# Patient Record
Sex: MALE | HISPANIC OR LATINO | Employment: UNEMPLOYED | URBAN - NONMETROPOLITAN AREA
[De-identification: names, ages, dates, MRNs, and addresses within clinical notes are randomized per-mention and may not be internally consistent; named-entity substitution may affect disease eponyms.]

---

## 2017-04-03 ENCOUNTER — HOSPITAL ENCOUNTER (INPATIENT)
Facility: HOSPITAL | Age: 47
LOS: 3 days | Discharge: HOME | End: 2017-04-06
Attending: FAMILY MEDICINE | Admitting: INTERNAL MEDICINE

## 2017-04-03 ENCOUNTER — APPOINTMENT (OUTPATIENT)
Dept: GENERAL RADIOLOGY | Facility: HOSPITAL | Age: 47
End: 2017-04-03

## 2017-04-03 ENCOUNTER — APPOINTMENT (OUTPATIENT)
Dept: CT IMAGING | Facility: HOSPITAL | Age: 47
End: 2017-04-03

## 2017-04-03 DIAGNOSIS — K57.92 ACUTE DIVERTICULITIS: Primary | ICD-10-CM

## 2017-04-03 DIAGNOSIS — E87.0 ACUTE HYPERNATREMIA: ICD-10-CM

## 2017-04-03 DIAGNOSIS — E86.0 SEVERE DEHYDRATION: ICD-10-CM

## 2017-04-03 LAB
ALBUMIN SERPL-MCNC: 4.9 G/DL (ref 3.4–4.8)
ALBUMIN/GLOB SERPL: 1.5 G/DL (ref 1.1–1.8)
ALP SERPL-CCNC: 105 U/L (ref 38–126)
ALT SERPL W P-5'-P-CCNC: 54 U/L (ref 21–72)
AMYLASE SERPL-CCNC: 82 U/L (ref 50–130)
ANION GAP SERPL CALCULATED.3IONS-SCNC: 18 MMOL/L (ref 5–15)
AST SERPL-CCNC: 36 U/L (ref 17–59)
BACTERIA UR QL AUTO: ABNORMAL /HPF
BASOPHILS # BLD AUTO: 0.03 10*3/MM3 (ref 0–0.2)
BASOPHILS NFR BLD AUTO: 0.2 % (ref 0–2)
BILIRUB SERPL-MCNC: 0.4 MG/DL (ref 0.2–1.3)
BILIRUB UR QL STRIP: NEGATIVE
BUN BLD-MCNC: 16 MG/DL (ref 7–21)
BUN/CREAT SERPL: 21.3 (ref 7–25)
CALCIUM SPEC-SCNC: 9.4 MG/DL (ref 8.4–10.2)
CHLORIDE SERPL-SCNC: 100 MMOL/L (ref 95–110)
CK MB SERPL-CCNC: 0.77 NG/ML (ref 0–5)
CK SERPL-CCNC: 65 U/L (ref 55–170)
CLARITY UR: ABNORMAL
CO2 SERPL-SCNC: 35 MMOL/L (ref 22–31)
COLOR UR: YELLOW
CREAT BLD-MCNC: 0.75 MG/DL (ref 0.7–1.3)
DEPRECATED RDW RBC AUTO: 40.1 FL (ref 35.1–43.9)
EOSINOPHIL # BLD AUTO: 0.01 10*3/MM3 (ref 0–0.7)
EOSINOPHIL NFR BLD AUTO: 0.1 % (ref 0–7)
ERYTHROCYTE [DISTWIDTH] IN BLOOD BY AUTOMATED COUNT: 13.3 % (ref 11.5–14.5)
GFR SERPL CREATININE-BSD FRML MDRD: 113 ML/MIN/1.73 (ref 60–147)
GFR SERPL CREATININE-BSD FRML MDRD: 137 ML/MIN/1.73 (ref 63–147)
GLOBULIN UR ELPH-MCNC: 3.3 GM/DL (ref 2.3–3.5)
GLUCOSE BLD-MCNC: 133 MG/DL (ref 60–100)
GLUCOSE UR STRIP-MCNC: NEGATIVE MG/DL
HCT VFR BLD AUTO: 47.1 % (ref 39–49)
HGB BLD-MCNC: 16.3 G/DL (ref 13.7–17.3)
HGB UR QL STRIP.AUTO: NEGATIVE
HOLD SPECIMEN: NORMAL
HYALINE CASTS UR QL AUTO: ABNORMAL /LPF
IMM GRANULOCYTES # BLD: 0.03 10*3/MM3 (ref 0–0.02)
IMM GRANULOCYTES NFR BLD: 0.2 % (ref 0–0.5)
KETONES UR QL STRIP: NEGATIVE
LEUKOCYTE ESTERASE UR QL STRIP.AUTO: NEGATIVE
LIPASE SERPL-CCNC: 37 U/L (ref 23–300)
LYMPHOCYTES # BLD AUTO: 1.23 10*3/MM3 (ref 0.6–4.2)
LYMPHOCYTES NFR BLD AUTO: 9.1 % (ref 10–50)
MCH RBC QN AUTO: 28.9 PG (ref 26.5–34)
MCHC RBC AUTO-ENTMCNC: 34.6 G/DL (ref 31.5–36.3)
MCV RBC AUTO: 83.5 FL (ref 80–98)
MONOCYTES # BLD AUTO: 0.46 10*3/MM3 (ref 0–0.9)
MONOCYTES NFR BLD AUTO: 3.4 % (ref 0–12)
NEUTROPHILS # BLD AUTO: 11.75 10*3/MM3 (ref 2–8.6)
NEUTROPHILS NFR BLD AUTO: 87 % (ref 37–80)
NITRITE UR QL STRIP: NEGATIVE
PH UR STRIP.AUTO: 8.5 [PH] (ref 5–9)
PLATELET # BLD AUTO: 305 10*3/MM3 (ref 150–450)
PMV BLD AUTO: 10.7 FL (ref 8–12)
POTASSIUM BLD-SCNC: 3.1 MMOL/L (ref 3.5–5.1)
PROT SERPL-MCNC: 8.2 G/DL (ref 6.3–8.6)
PROT UR QL STRIP: ABNORMAL
RBC # BLD AUTO: 5.64 10*6/MM3 (ref 4.37–5.74)
RBC # UR: ABNORMAL /HPF
REF LAB TEST METHOD: ABNORMAL
SODIUM BLD-SCNC: 153 MMOL/L (ref 137–145)
SP GR UR STRIP: 1.02 (ref 1–1.03)
SQUAMOUS #/AREA URNS HPF: ABNORMAL /HPF
TROPONIN I SERPL-MCNC: <0.012 NG/ML
UROBILINOGEN UR QL STRIP: ABNORMAL
WBC NRBC COR # BLD: 13.51 10*3/MM3 (ref 3.2–9.8)
WBC UR QL AUTO: ABNORMAL /HPF
WHOLE BLOOD HOLD SPECIMEN: NORMAL

## 2017-04-03 PROCEDURE — 0 IOPAMIDOL 61 % SOLUTION: Performed by: FAMILY MEDICINE

## 2017-04-03 PROCEDURE — 82150 ASSAY OF AMYLASE: CPT | Performed by: FAMILY MEDICINE

## 2017-04-03 PROCEDURE — 25010000002 LEVOFLOXACIN PER 250 MG: Performed by: FAMILY MEDICINE

## 2017-04-03 PROCEDURE — 74177 CT ABD & PELVIS W/CONTRAST: CPT

## 2017-04-03 PROCEDURE — 99284 EMERGENCY DEPT VISIT MOD MDM: CPT

## 2017-04-03 PROCEDURE — 80053 COMPREHEN METABOLIC PANEL: CPT | Performed by: FAMILY MEDICINE

## 2017-04-03 PROCEDURE — 82553 CREATINE MB FRACTION: CPT | Performed by: FAMILY MEDICINE

## 2017-04-03 PROCEDURE — 25010000002 ONDANSETRON PER 1 MG: Performed by: FAMILY MEDICINE

## 2017-04-03 PROCEDURE — 74022 RADEX COMPL AQT ABD SERIES: CPT

## 2017-04-03 PROCEDURE — 74270 X-RAY XM COLON 1CNTRST STD: CPT

## 2017-04-03 PROCEDURE — 81001 URINALYSIS AUTO W/SCOPE: CPT | Performed by: FAMILY MEDICINE

## 2017-04-03 PROCEDURE — 99232 SBSQ HOSP IP/OBS MODERATE 35: CPT | Performed by: SURGERY

## 2017-04-03 PROCEDURE — 85025 COMPLETE CBC W/AUTO DIFF WBC: CPT | Performed by: FAMILY MEDICINE

## 2017-04-03 PROCEDURE — 82550 ASSAY OF CK (CPK): CPT | Performed by: FAMILY MEDICINE

## 2017-04-03 PROCEDURE — 25010000002 MORPHINE PER 10 MG: Performed by: FAMILY MEDICINE

## 2017-04-03 PROCEDURE — 84484 ASSAY OF TROPONIN QUANT: CPT | Performed by: FAMILY MEDICINE

## 2017-04-03 PROCEDURE — 83690 ASSAY OF LIPASE: CPT | Performed by: FAMILY MEDICINE

## 2017-04-03 RX ORDER — DEXTROSE MONOHYDRATE 50 MG/ML
125 INJECTION, SOLUTION INTRAVENOUS CONTINUOUS
Status: DISCONTINUED | OUTPATIENT
Start: 2017-04-03 | End: 2017-04-06 | Stop reason: HOSPADM

## 2017-04-03 RX ORDER — PANTOPRAZOLE SODIUM 40 MG/10ML
40 INJECTION, POWDER, LYOPHILIZED, FOR SOLUTION INTRAVENOUS
Status: DISCONTINUED | OUTPATIENT
Start: 2017-04-04 | End: 2017-04-06 | Stop reason: HOSPADM

## 2017-04-03 RX ORDER — LEVOFLOXACIN 5 MG/ML
750 INJECTION, SOLUTION INTRAVENOUS ONCE
Status: COMPLETED | OUTPATIENT
Start: 2017-04-03 | End: 2017-04-03

## 2017-04-03 RX ORDER — PANTOPRAZOLE SODIUM 40 MG/10ML
80 INJECTION, POWDER, LYOPHILIZED, FOR SOLUTION INTRAVENOUS ONCE
Status: COMPLETED | OUTPATIENT
Start: 2017-04-03 | End: 2017-04-03

## 2017-04-03 RX ORDER — ONDANSETRON 2 MG/ML
4 INJECTION INTRAMUSCULAR; INTRAVENOUS EVERY 6 HOURS PRN
Status: DISCONTINUED | OUTPATIENT
Start: 2017-04-03 | End: 2017-04-06 | Stop reason: HOSPADM

## 2017-04-03 RX ORDER — SODIUM CHLORIDE 0.9 % (FLUSH) 0.9 %
1-10 SYRINGE (ML) INJECTION AS NEEDED
Status: DISCONTINUED | OUTPATIENT
Start: 2017-04-03 | End: 2017-04-06 | Stop reason: HOSPADM

## 2017-04-03 RX ORDER — SODIUM CHLORIDE 9 MG/ML
125 INJECTION, SOLUTION INTRAVENOUS CONTINUOUS
Status: DISCONTINUED | OUTPATIENT
Start: 2017-04-03 | End: 2017-04-03

## 2017-04-03 RX ORDER — SODIUM CHLORIDE 9 MG/ML
1000 INJECTION, SOLUTION INTRAVENOUS ONCE
Status: COMPLETED | OUTPATIENT
Start: 2017-04-03 | End: 2017-04-03

## 2017-04-03 RX ORDER — MORPHINE SULFATE 4 MG/ML
4 INJECTION, SOLUTION INTRAMUSCULAR; INTRAVENOUS ONCE
Status: COMPLETED | OUTPATIENT
Start: 2017-04-03 | End: 2017-04-03

## 2017-04-03 RX ORDER — ONDANSETRON 2 MG/ML
4 INJECTION INTRAMUSCULAR; INTRAVENOUS ONCE
Status: COMPLETED | OUTPATIENT
Start: 2017-04-03 | End: 2017-04-03

## 2017-04-03 RX ADMIN — METRONIDAZOLE 500 MG: 500 INJECTION, SOLUTION INTRAVENOUS at 21:06

## 2017-04-03 RX ADMIN — ONDANSETRON 4 MG: 2 INJECTION INTRAMUSCULAR; INTRAVENOUS at 13:04

## 2017-04-03 RX ADMIN — DEXTROSE MONOHYDRATE 125 ML/HR: 50 INJECTION, SOLUTION INTRAVENOUS at 21:07

## 2017-04-03 RX ADMIN — DEXTROSE MONOHYDRATE 125 ML/HR: 50 INJECTION, SOLUTION INTRAVENOUS at 15:03

## 2017-04-03 RX ADMIN — SODIUM CHLORIDE 125 ML/HR: 9 INJECTION, SOLUTION INTRAVENOUS at 13:09

## 2017-04-03 RX ADMIN — LEVOFLOXACIN 750 MG: 5 INJECTION, SOLUTION INTRAVENOUS at 15:42

## 2017-04-03 RX ADMIN — MORPHINE SULFATE 4 MG: 4 INJECTION, SOLUTION INTRAMUSCULAR; INTRAVENOUS at 13:06

## 2017-04-03 RX ADMIN — SODIUM CHLORIDE 1000 ML: 900 INJECTION, SOLUTION INTRAVENOUS at 13:51

## 2017-04-03 RX ADMIN — PANTOPRAZOLE SODIUM 80 MG: 40 INJECTION, POWDER, FOR SOLUTION INTRAVENOUS at 13:01

## 2017-04-03 RX ADMIN — IOPAMIDOL 94 ML: 612 INJECTION, SOLUTION INTRAVENOUS at 14:34

## 2017-04-03 NOTE — CONSULTS
Consult Note  Patient Care Team:  No Known Provider as PCP - Juliette Shah MD  Chief complaint abd pain    Subjective     Patient is a 46 y.o. male presents with a one-month history of worsening abdominal pain.  His gotten much worse in the past 4 days.  He still having bowel movements.  He did have a bowel movement yesterday.  He had some nausea and vomiting today which is now better.  He has not had any fevers.  He past surgical history of appendectomy.  There is no family history of colon cancer.  He's never had a colonoscopy.  He's never had diverticulitis before.  The pain is mostly in his left lower quadrant in a bandlike fashion.    Review of Systems   Review of Systems   Constitutional: Negative.    HENT: Negative.    Eyes: Negative.    Respiratory: Negative.    Cardiovascular: Negative.    Gastrointestinal: Negative.    Endocrine: Negative.    Genitourinary: Negative.    Musculoskeletal: Negative.    Skin: Negative.    Allergic/Immunologic: Negative.    Neurological: Negative.    Hematological: Negative.    Psychiatric/Behavioral: Negative.      History  History reviewed. No pertinent past medical history.  History reviewed. No pertinent surgical history.  No family history on file.  Social History   Substance Use Topics   • Smoking status: None   • Smokeless tobacco: None   • Alcohol use None       (Not in a hospital admission)  Allergies:  Review of patient's allergies indicates no known allergies.    Objective     Vital Signs  Temp:  [98.7 °F (37.1 °C)] 98.7 °F (37.1 °C)  Heart Rate:  [50-65] 59  Resp:  [18-20] 18  BP: (124-165)/(58-83) 124/58    Physical Exam:    Physical Exam   Constitutional: He is oriented to person, place, and time. He appears well-developed.   HENT:   Head: Normocephalic and atraumatic.   Eyes: Pupils are equal, round, and reactive to light.   Neck: Normal range of motion. Neck supple.   Cardiovascular: Normal rate, regular rhythm, normal heart sounds and intact  distal pulses.    Pulmonary/Chest: Effort normal and breath sounds normal.   Abdominal: Soft. Bowel sounds are normal. He exhibits distension. There is tenderness.   Musculoskeletal: Normal range of motion.   Neurological: He is alert and oriented to person, place, and time.   Skin: Skin is warm and dry.   Psychiatric: He has a normal mood and affect. His behavior is normal.    abdomen is diffusely distended however there is no focal tenderness anywhere.    Results Review:      Lab Results (last 24 hours)     Procedure Component Value Units Date/Time    [facility] Draw [[id number]] Collected:  04/03/17 1300    Specimen:  Blood Updated:  04/03/17 1311    Narrative:       The following orders were created for panel order [facility] Draw.  Procedure                               Abnormality         Status                     ---------                               -----------         ------                     Light Blue Top[[id number]]                                    In process                 Gold Top - SST[[id number]]                                    In process                   Please view results for these tests on the individual orders.    [facility] - SST [[id number]] Collected:  04/03/17 1300    Specimen:  Blood Updated:  04/03/17 1311    Light Blue Top [[id number]] Collected:  04/03/17 1300    Specimen:  Blood Updated:  04/03/17 1311    CBC & Differential [[id number]] Collected:  04/03/17 1300    Specimen:  Blood Updated:  04/03/17 1322    Narrative:       The following orders were created for panel order CBC & Differential.  Procedure                               Abnormality         Status                     ---------                               -----------         ------                     CBC Auto Differential[[id number]]         Abnormal            Final result                 Please view results for these tests on the individual orders.    CBC Auto Differential [[id number]]  (Abnormal) Collected:  04/03/17 1300     Specimen:  Blood Updated:  04/03/17 1322     WBC 13.51 (H) 10*3/mm3      RBC 5.64 10*6/mm3      Hemoglobin 16.3 g/dL      Hematocrit 47.1 %      MCV 83.5 fL      MCH 28.9 pg      MCHC 34.6 g/dL      RDW 13.3 %      RDW-SD 40.1 fl      MPV 10.7 fL      Platelets 305 10*3/mm3      Neutrophil % 87.0 (H) %      Lymphocyte % 9.1 (L) %      Monocyte % 3.4 %      Eosinophil % 0.1 %      Basophil % 0.2 %      Immature Grans % 0.2 %      Neutrophils, Absolute 11.75 (H) 10*3/mm3      Lymphocytes, Absolute 1.23 10*3/mm3      Monocytes, Absolute 0.46 10*3/mm3      Eosinophils, Absolute 0.01 10*3/mm3      Basophils, Absolute 0.03 10*3/mm3      Immature Grans, Absolute 0.03 (H) 10*3/mm3     Comprehensive Metabolic Panel [[id number]]  (Abnormal) Collected:  04/03/17 1300    Specimen:  Blood Updated:  04/03/17 1330     Glucose 133 (H) mg/dL      BUN 16 mg/dL      Creatinine 0.75 mg/dL      Sodium 153 (H) mmol/L      Potassium 3.1 (L) mmol/L      Chloride 100 mmol/L      CO2 35.0 (H) mmol/L      Calcium 9.4 mg/dL      Total Protein 8.2 g/dL      Albumin 4.90 (H) g/dL      ALT (SGPT) 54 U/L      AST (SGOT) 36 U/L      Alkaline Phosphatase 105 U/L      Total Bilirubin 0.4 mg/dL      eGFR Non African Amer 113 mL/min/1.73      eGFR  African Amer 137 mL/min/1.73      Globulin 3.3 gm/dL      A/G Ratio 1.5 g/dL      BUN/Creatinine Ratio 21.3     Anion Gap 18.0 (H) mmol/L     Lipase [[id number]]  (Normal) Collected:  04/03/17 1300    Specimen:  Blood Updated:  04/03/17 1330     Lipase 37 U/L     Amylase [[id number]]  (Normal) Collected:  04/03/17 1300    Specimen:  Blood Updated:  04/03/17 1330     Amylase 82 U/L     CK [[id number]]  (Normal) Collected:  04/03/17 1300    Specimen:  Blood Updated:  04/03/17 1330     Creatine Kinase 65 U/L     CK-MB [[id number]]  (Normal) Collected:  04/03/17 1300    Specimen:  Blood Updated:  04/03/17 1343     CKMB 0.77 ng/mL     Troponin [[id number]]  (Normal) Collected:  04/03/17 1300    Specimen:  Blood Updated:   04/03/17 1343     Troponin I <0.012 ng/mL     Urinalysis With / Culture If Indicated [[id number]]  (Abnormal) Collected:  04/03/17 1329    Specimen:  Urine from Urine, Clean Catch Updated:  04/03/17 1426     Color, UA Yellow     Appearance, UA Cloudy (A)     pH, UA 8.5     Specific Gravity, UA 1.020     Glucose, UA Negative     Ketones, UA Negative     Bilirubin, UA Negative     Blood, UA Negative     Protein,  mg/dL (2+) (A)     Leuk Esterase, UA Negative     Nitrite, UA Negative     Urobilinogen, UA 0.2 E.U./dL    Urinalysis, Microscopic Only [[id number]]  (Abnormal) Collected:  04/03/17 1329    Specimen:  Urine from Urine, Clean Catch Updated:  04/03/17 1426     RBC, UA 0-2 (A) /HPF      WBC, UA 0-2 /HPF      Bacteria, UA None Seen /HPF      Squamous Epithelial Cells, UA None Seen /HPF      Hyaline Casts, UA 0-2 /LPF      Methodology Automated Microscopy       CT scan is reviewed and appears to be an obstructing process in the proximal sigmoid colon.  It is difficult to differentiate the other it is a mass or a diverticulitis.  There is some stranding around it.  The maximal width of the cecum is 8.5 centimeters.    Assessment/Plan     Active Problems:    Acute diverticulitis    Mr. Elvin toledo is a 46-year-old gentleman with large bowel obstruction due to diverticulitis versus colon cancer.  His history and exam is more likely to fit with a obstructing colon cancer.  However on still treat him as if he was a diverticulitis with nothing by mouth and IV antibiotics.  In this is a subacute obstruction and an doesn't appear to have an impending perforation at this time.  So he doesn't an operation today but he will need one soon.  Would like to get him and diagnosed and decompressed and this would be best be done with a colonoscopy.  Have consulted with GI Dr. Haile for colonoscopy and management.  We'll also order a barium enema to further delineate the obstruction.  I would recommend to place an NG tube if he  becomes nauseated or throws up or becomes more distended.  He also has multiple electrolyte abnormalities that need to be corrected before he goes to the OR.  Thank you for the consult.  I'll follow along closely.    I discussed the patients findings and my recommendations with patient and family.     Bereket Velasquez MD  04/03/17  3:59 PM

## 2017-04-03 NOTE — ED NOTES
Pt c/o abdominal pain for four days without a BM. The pt drank a whole bottle of milk of magnesia, half yesterday and half today without having a BM. The pt's abdomin is tender to the touch and distended. Pt is vomiting and in pain.      Vivian Paris, RN  04/03/17 5738

## 2017-04-03 NOTE — ED PROVIDER NOTES
"Subjective   HPI Comments: 46 year old presents for periumbilical pain with severe vomiting  /nausea .       Patient is a 46 y.o. male presenting with abdominal pain.   History provided by:  Patient  Abdominal Pain   Pain location:  Periumbilical  Pain quality: aching and dull    Pain radiates to:  Does not radiate  Pain severity:  Moderate  Onset quality:  Sudden  Duration:  4 days  Timing:  Constant  Progression:  Worsening  Chronicity:  New  Context: not alcohol use, not awakening from sleep, not diet changes, not medication withdrawal, not previous surgeries, not recent illness, not retching, not sick contacts and not suspicious food intake    Relieved by:  Nothing  Worsened by:  Nothing  Ineffective treatments:  None tried  Associated symptoms: anorexia, fatigue, nausea and vomiting    Risk factors: no alcohol abuse, no aspirin use, not elderly, has not had multiple surgeries, no NSAID use and not obese        Review of Systems   Constitutional: Positive for fatigue.   HENT: Negative.    Respiratory: Negative.    Cardiovascular: Negative.    Gastrointestinal: Positive for abdominal pain, anorexia, nausea and vomiting.   Musculoskeletal: Negative.    Skin: Negative.    Neurological: Negative.    Psychiatric/Behavioral: Negative.        History reviewed. No pertinent past medical history.    No Known Allergies    History reviewed. No pertinent surgical history.    No family history on file.    Social History     Social History   • Marital status:      Spouse name: N/A   • Number of children: N/A   • Years of education: N/A     Social History Main Topics   • Smoking status: None   • Smokeless tobacco: None   • Alcohol use None   • Drug use: None   • Sexual activity: Not Asked     Other Topics Concern   • None     Social History Narrative   • None           Objective    /80 (BP Location: Right arm, Patient Position: Lying)  Pulse 54  Temp 98.7 °F (37.1 °C) (Oral)   Resp 18  Ht 63\" (160 cm)  Wt 169 " lb 12.8 oz (77 kg)  SpO2 99%  BMI 30.08 kg/m2      Physical Exam   Constitutional: He is oriented to person, place, and time. He appears well-developed and well-nourished.   HENT:   Head: Normocephalic and atraumatic.   Right Ear: External ear normal.   Left Ear: External ear normal.   Nose: Nose normal.   Mouth/Throat: Oropharynx is clear and moist.   Eyes: Conjunctivae and EOM are normal. Pupils are equal, round, and reactive to light.   Neck: Normal range of motion. Neck supple.   Cardiovascular: Normal rate, regular rhythm, normal heart sounds and intact distal pulses.    Pulmonary/Chest: Effort normal and breath sounds normal. No accessory muscle usage. No respiratory distress. He exhibits no tenderness and no deformity.   Abdominal: Soft. He exhibits distension. Bowel sounds are decreased. There is tenderness in the periumbilical area and suprapubic area. There is guarding.   Musculoskeletal: Normal range of motion.   Neurological: He is alert and oriented to person, place, and time. He has normal reflexes.   Skin: Skin is warm.   Psychiatric: He has a normal mood and affect. His behavior is normal. Judgment and thought content normal.   Nursing note and vitals reviewed.      Procedures         ED Course  ED Course      Labs Reviewed   COMPREHENSIVE METABOLIC PANEL - Abnormal; Notable for the following:        Result Value    Glucose 133 (*)     Sodium 153 (*)     Potassium 3.1 (*)     CO2 35.0 (*)     Albumin 4.90 (*)     Anion Gap 18.0 (*)     All other components within normal limits   URINALYSIS W/ CULTURE IF INDICATED - Abnormal; Notable for the following:     Appearance, UA Cloudy (*)     Protein,  mg/dL (2+) (*)     All other components within normal limits   CBC WITH AUTO DIFFERENTIAL - Abnormal; Notable for the following:     WBC 13.51 (*)     Neutrophil % 87.0 (*)     Lymphocyte % 9.1 (*)     Neutrophils, Absolute 11.75 (*)     Immature Grans, Absolute 0.03 (*)     All other components within  normal limits   URINALYSIS, MICROSCOPIC ONLY - Abnormal; Notable for the following:     RBC, UA 0-2 (*)     All other components within normal limits   LIPASE - Normal   AMYLASE - Normal   CK - Normal   CK MB - Normal   TROPONIN (IN-HOUSE) - Normal   RAINBOW DRAW    Narrative:     The following orders were created for panel order [place] Draw.  Procedure                               Abnormality         Status                     ---------                               -----------         ------                     Light Blue Top[[id number]]                                    In process                 Gold Top - SST[[id number]]                                    In process                   Please view results for these tests on the individual orders.   CBC AND DIFFERENTIAL    Narrative:     The following orders were created for panel order CBC & Differential.  Procedure                               Abnormality         Status                     ---------                               -----------         ------                     CBC Auto Differential[[id number]]         Abnormal            Final result                 Please view results for these tests on the individual orders.   LIGHT BLUE TOP   GOLD TOP - SST     Xr Abdomen 2 View With Chest 1 View    Result Date: 4/3/2017  Narrative: Patient Name:  LANCE NOBLES Patient ID:  [id number] Ordering:  SUN CHU Attending:  SUN CHU Referring:  SUN CHU ------------------------------------------------ Abdominal series with single view chest HISTORY: Vomiting. Nausea. Upright PA film of the chest and supine and upright abdominal films obtained. COMPARISON: None Low lung volumes. The lungs are clear of an acute process. The heart is not enlarged. The pulmonary vasculature is not increased. No pleural effusion. No pneumothorax. No acute osseous abnormality. No free air. Gas and feces in the colon with some gaseous distention transverse colon.  Air-fluid levels in the small and large bowel, compatible with an adynamic ileus. No organomegaly. No abnormal calcifications. Old compression deformities lumbar spine. Minimal degenerative changes lumbar spine. Surgical sutures right lower quadrant.     Impression: CONCLUSION: Adynamic ileus. Low lung volumes. [id number] Electronically signed by:  Ross Cleveland MD  4/3/2017 1:33 PM CDT Workstation: [facility]    Ct Abdomen Pelvis With Contrast    Result Date: 4/3/2017  Narrative: Patient Name:  LANCE NOBLES Patient ID:  [id number] Ordering:  SUN CHU Attending:  SUN CHU Referring:  SUN CHU ------------------------------------------------ CT abdomen, pelvis with contrast. CLINICAL INDICATION: Periumbilical abdominal pain.   COMPARISON: CT abdomen, pelvis February 11, 2010.   TECHNIQUE: Oral and nonionic IV contrast. 94 mL Isovue-300. Helical scanning with axial and coronal reformations. Soft tissue, lung, and bone windows reviewed. This exam was performed using radiation doses that are As Low As Reasonably Achievable (ALARA).This exam was performed according to our departmental dose optimization program, which includes automated exposure control, adjustment of the mA and/or KV according to patient size and/or use of iterative reconstruction technique. ABDOMEN CT FINDINGS: The visualized lung bases show minor dependent changes. Contrast is noted within the distal esophagus suggesting gastroesophageal reflux. Liver, gallbladder, spleen, pancreas, adrenal glands and kidneys are normal in appearance. The colon is very dilated. The cecum 8.73 cm. The colon is filled with fluid and stool. There is an area of luminal narrowing and pericolonic inflammatory changes in the sigmoid colon series 2 images 75-77. This may represent an obstructing sigmoid neoplasm versus diverticulitis. Colonoscopy should be considered for further evaluation. No evidence of pathologically enlarged nodes, free air, or free  fluid. Degenerative changes of the spine. The bones are grossly unremarkable. PELVIS CT FINDINGS: No pelvic masses.    No evidence of pathologically enlarged nodes, free air, or free fluid. The bones are grossly unremarkable.     Impression: CONCLUSION: Dilated colon filled with fluid and stool . Area of luminal narrowing and pericolonic inflammatory changes sigmoid colon. Differential considerations would include an obstructing sigmoid neoplasm versus diverticulitis. CT abdomen, pelvis with contrast is otherwise unremarkable. Electronically signed by:  Kvng Rasmussen MD  4/3/2017 3:03 PM CDT Workstation: [id number]    Ileus on x ray - ct scan reveals - inflammation around sigmoid colon with luminal narrowing.   Discussed case with dr Velasquez and will see pt in ed.   Discussed with hospitalist to admit for iv antbiotics and stabilisation of condition .             MDM  Number of Diagnoses or Management Options  Acute diverticulitis:   Acute hypernatremia:   Severe dehydration:      Amount and/or Complexity of Data Reviewed  Clinical lab tests: ordered and reviewed  Tests in the radiology section of CPT®: ordered and reviewed  Tests in the medicine section of CPT®: ordered and reviewed        Final diagnoses:   Acute diverticulitis   Severe dehydration   Acute hypernatremia            Juliette Lacy MD  04/03/17 9077

## 2017-04-04 ENCOUNTER — APPOINTMENT (OUTPATIENT)
Dept: GENERAL RADIOLOGY | Facility: HOSPITAL | Age: 47
End: 2017-04-04

## 2017-04-04 LAB
ANION GAP SERPL CALCULATED.3IONS-SCNC: 14 MMOL/L (ref 5–15)
BASOPHILS # BLD AUTO: 0.03 10*3/MM3 (ref 0–0.2)
BASOPHILS NFR BLD AUTO: 0.4 % (ref 0–2)
BUN BLD-MCNC: 13 MG/DL (ref 7–21)
BUN/CREAT SERPL: 19.1 (ref 7–25)
CALCIUM SPEC-SCNC: 8.3 MG/DL (ref 8.4–10.2)
CHLORIDE SERPL-SCNC: 102 MMOL/L (ref 95–110)
CO2 SERPL-SCNC: 29 MMOL/L (ref 22–31)
CREAT BLD-MCNC: 0.68 MG/DL (ref 0.7–1.3)
DEPRECATED RDW RBC AUTO: 41.1 FL (ref 35.1–43.9)
EOSINOPHIL # BLD AUTO: 0.08 10*3/MM3 (ref 0–0.7)
EOSINOPHIL NFR BLD AUTO: 1.2 % (ref 0–7)
ERYTHROCYTE [DISTWIDTH] IN BLOOD BY AUTOMATED COUNT: 13.5 % (ref 11.5–14.5)
GFR SERPL CREATININE-BSD FRML MDRD: 127 ML/MIN/1.73 (ref 60–147)
GFR SERPL CREATININE-BSD FRML MDRD: 154 ML/MIN/1.73 (ref 63–147)
GLUCOSE BLD-MCNC: 107 MG/DL (ref 60–100)
HCT VFR BLD AUTO: 39.8 % (ref 39–49)
HGB BLD-MCNC: 13.7 G/DL (ref 13.7–17.3)
IMM GRANULOCYTES # BLD: 0.01 10*3/MM3 (ref 0–0.02)
IMM GRANULOCYTES NFR BLD: 0.1 % (ref 0–0.5)
LYMPHOCYTES # BLD AUTO: 1.66 10*3/MM3 (ref 0.6–4.2)
LYMPHOCYTES NFR BLD AUTO: 24.4 % (ref 10–50)
MCH RBC QN AUTO: 29 PG (ref 26.5–34)
MCHC RBC AUTO-ENTMCNC: 34.4 G/DL (ref 31.5–36.3)
MCV RBC AUTO: 84.3 FL (ref 80–98)
MONOCYTES # BLD AUTO: 0.36 10*3/MM3 (ref 0–0.9)
MONOCYTES NFR BLD AUTO: 5.3 % (ref 0–12)
NEUTROPHILS # BLD AUTO: 4.65 10*3/MM3 (ref 2–8.6)
NEUTROPHILS NFR BLD AUTO: 68.6 % (ref 37–80)
PLATELET # BLD AUTO: 247 10*3/MM3 (ref 150–450)
PMV BLD AUTO: 11.1 FL (ref 8–12)
POTASSIUM BLD-SCNC: 3.4 MMOL/L (ref 3.5–5.1)
RBC # BLD AUTO: 4.72 10*6/MM3 (ref 4.37–5.74)
SODIUM BLD-SCNC: 145 MMOL/L (ref 137–145)
WBC NRBC COR # BLD: 6.79 10*3/MM3 (ref 3.2–9.8)

## 2017-04-04 PROCEDURE — 85025 COMPLETE CBC W/AUTO DIFF WBC: CPT | Performed by: HOSPITALIST

## 2017-04-04 PROCEDURE — 80048 BASIC METABOLIC PNL TOTAL CA: CPT | Performed by: HOSPITALIST

## 2017-04-04 PROCEDURE — 99231 SBSQ HOSP IP/OBS SF/LOW 25: CPT | Performed by: SURGERY

## 2017-04-04 PROCEDURE — 74022 RADEX COMPL AQT ABD SERIES: CPT

## 2017-04-04 RX ADMIN — DEXTROSE MONOHYDRATE 125 ML/HR: 50 INJECTION, SOLUTION INTRAVENOUS at 22:36

## 2017-04-04 RX ADMIN — DEXTROSE MONOHYDRATE 125 ML/HR: 50 INJECTION, SOLUTION INTRAVENOUS at 14:51

## 2017-04-04 RX ADMIN — DEXTROSE MONOHYDRATE 125 ML/HR: 50 INJECTION, SOLUTION INTRAVENOUS at 06:16

## 2017-04-04 RX ADMIN — PANTOPRAZOLE SODIUM 40 MG: 40 INJECTION, POWDER, FOR SOLUTION INTRAVENOUS at 06:16

## 2017-04-04 NOTE — PLAN OF CARE
Problem: Patient Care Overview (Adult)  Goal: Plan of Care Review  Outcome: Ongoing (interventions implemented as appropriate)    04/04/17 0126   Coping/Psychosocial Response Interventions   Plan Of Care Reviewed With patient;family   Patient Care Overview   Progress no change   Outcome Evaluation   Outcome Summary/Follow up Plan new admit       Goal: Adult Individualization and Mutuality  Outcome: Ongoing (interventions implemented as appropriate)  Goal: Discharge Needs Assessment  Outcome: Ongoing (interventions implemented as appropriate)    Problem: Pain, Acute (Adult)  Goal: Identify Related Risk Factors and Signs and Symptoms  Outcome: Ongoing (interventions implemented as appropriate)  Goal: Acceptable Pain Control/Comfort Level  Outcome: Ongoing (interventions implemented as appropriate)    Problem: Fluid Volume Excess (Adult,Obstetrics,Pediatric)  Goal: Identify Related Risk Factors and Signs and Symptoms  Outcome: Ongoing (interventions implemented as appropriate)  Goal: Stable Weight  Outcome: Ongoing (interventions implemented as appropriate)  Goal: Balanced Intake/Output  Outcome: Ongoing (interventions implemented as appropriate)

## 2017-04-04 NOTE — PROGRESS NOTES
[facility] Medicine Services  INPATIENT PROGRESS NOTE    Length of Stay: 1  Date of Admission: 4/3/2017  Primary Care Physician: No Known Provider    Subjective   Chief Complaint: Some abdominal pain  HPI:  Patient states that his pain is better.  No nausea or vomiting.    Review of Systems   Constitutional: Negative for appetite change, chills, fatigue and fever.   Respiratory: Negative for chest tightness and shortness of breath.    Cardiovascular: Negative for chest pain, palpitations and leg swelling.   Gastrointestinal: Positive for abdominal pain. Negative for constipation, diarrhea, nausea and vomiting.   Skin: Negative for wound.   Neurological: Negative for dizziness, weakness, light-headedness, numbness and headaches.        All pertinent negatives and positives are as above. All other systems have been reviewed and are negative unless otherwise stated.     Objective    Temp:  [97.8 °F (36.6 °C)-98.9 °F (37.2 °C)] 97.8 °F (36.6 °C)  Heart Rate:  [47-63] 47  Resp:  [18] 18  BP: (101-165)/(58-80) 114/62    Physical Exam   Constitutional: He appears well-developed and well-nourished.   HENT:   Head: Normocephalic and atraumatic.   Eyes: EOM are normal. Pupils are equal, round, and reactive to light.   Neck: Normal range of motion. Neck supple.   Cardiovascular: Normal rate, regular rhythm, normal heart sounds and intact distal pulses.  Exam reveals no gallop and no friction rub.    No murmur heard.  Pulmonary/Chest: Effort normal and breath sounds normal. No respiratory distress. He has no wheezes. He has no rales. He exhibits no tenderness.   Abdominal: Soft. Bowel sounds are normal. He exhibits no distension. There is tenderness (minimal).   Psychiatric: He has a normal mood and affect. His behavior is normal.   Vitals reviewed.          Results Review:  I have reviewed the labs, radiology results, and diagnostic studies.    Laboratory Data:     Results from last 7  days  Lab Units 04/04/17  0706 04/03/17  1300   SODIUM mmol/L 145 153*   POTASSIUM mmol/L 3.4* 3.1*   CHLORIDE mmol/L 102 100   TOTAL CO2 mmol/L 29.0 35.0*   BUN mg/dL 13 16   CREATININE mg/dL 0.68* 0.75   GLUCOSE mg/dL 107* 133*   CALCIUM mg/dL 8.3* 9.4   BILIRUBIN mg/dL  --  0.4   ALK PHOS U/L  --  105   ALT (SGPT) U/L  --  54   AST (SGOT) U/L  --  36   ANION GAP mmol/L 14.0 18.0*     Estimated Creatinine Clearance: 127.3 mL/min (by C-G formula based on Cr of 0.68).            Results from last 7 days  Lab Units 04/04/17  0707 04/03/17  1300   WBC 10*3/mm3 6.79 13.51*   HEMOGLOBIN g/dL 13.7 16.3   HEMATOCRIT % 39.8 47.1   PLATELETS 10*3/mm3 247 305       Radiology Data:   Imaging Results (last 24 hours)     Procedure Component Value Units Date/Time    CT Abdomen Pelvis With Contrast [[id number]] Collected:  04/03/17 1452     Updated:  04/03/17 1504    Narrative:       Patient Name:  LANCE NOBLES  Patient ID:  [id number]   Ordering:  SUN CHU  Attending:  SUN CHU  Referring:  SUN CHU  ------------------------------------------------  CT abdomen, pelvis with contrast.  CLINICAL INDICATION: Periumbilical abdominal pain.       COMPARISON: CT abdomen, pelvis February 11, 2010.       TECHNIQUE: Oral and nonionic IV contrast. 94 mL Isovue-300.  Helical scanning with axial and coronal reformations. Soft  tissue, lung, and bone windows reviewed. This exam was performed  using radiation doses that are As Low As Reasonably Achievable  (ALARA).This exam was performed according to our departmental  dose optimization program, which includes automated exposure  control, adjustment of the mA and/or KV according to patient size  and/or use of iterative reconstruction technique.    ABDOMEN CT FINDINGS: The visualized lung bases show minor  dependent changes. Contrast is noted within the distal esophagus  suggesting gastroesophageal reflux. Liver, gallbladder, spleen,  pancreas, adrenal glands and kidneys  are normal in appearance.     The colon is very dilated. The cecum 8.73 cm.   The colon is filled with fluid and stool. There is an area of  luminal narrowing and pericolonic inflammatory changes in the  sigmoid colon series 2 images 75-77. This may represent an  obstructing sigmoid neoplasm versus diverticulitis. Colonoscopy  should be considered for further evaluation.    No evidence of pathologically enlarged nodes, free air, or free  fluid. Degenerative changes of the spine.  The bones are grossly unremarkable.    PELVIS CT FINDINGS: No pelvic masses.    No evidence of  pathologically enlarged nodes, free air, or free fluid.     The bones are grossly unremarkable.      Impression:       CONCLUSION: Dilated colon filled with fluid and stool . Area of  luminal narrowing and pericolonic inflammatory changes sigmoid  colon. Differential considerations would include an obstructing  sigmoid neoplasm versus diverticulitis. CT abdomen, pelvis with  contrast is otherwise unremarkable.    Electronically signed by:  Kvng Rasmussen MD  4/3/2017 3:03 PM CDT  Workstation: [id number]    FL Barium Enema Water Soluble [[id number]] Collected:  04/03/17 1657     Updated:  04/03/17 1704    Narrative:       Patient Name:  LANCE NOBLES  Patient ID:  [id number]   Ordering:  VICTORIA ALCAZAR  Attending:  SUN CHU  Referring:  VICTORIA ALCAZAR  ------------------------------------------------    Hypaque enema.    HISTORY: Colonic obstruction.    Preliminary film demonstrates contrast within the large and small  bowel from recent contrast CT exam.    1.24 minutes of fluoroscopy was used. Two bottles of Gastroview  was used. Seven images were obtained.    There is unobstructed flow of contrast from the rectum to the mid  descending colon. No discrete colonic obstruction is appreciated  on the current exam.    There is mucosal edema in the sigmoid colon corresponding to the  abnormality seen on CT exam.      Impression:       CONCLUSION:    No  evidence of colonic obstruction. Mucosal edema and spasm in  the sigmoid colon at the site of abnormality seen on CT  examination. These findings therefore suggest a diagnosis of  diverticulitis as more likely than colonic neoplasm.    Electronically signed by:  Kvng Rasmussen MD  4/3/2017 5:03 PM CDT  Workstation: [id number]    XR Abdomen 2 View With Chest 1 View [[id number]] Collected:  04/04/17 0745     Updated:  04/04/17 0751    Narrative:       Patient Name:  LANCE NOBLES  Patient ID:  [id number]   Ordering:  CHRIS TAFOYA  Attending:  MANDI RODRIGUEZ  Referring:  CHRIS TAFOYA  ------------------------------------------------      Procedure: Acute abdomen series with CXR     Reason for exam: abd pain, K57.92 Diverticulitis of intestine,  part unspecified, without perforation or abscess without bleeding  E86.0 Dehydration E87.0 Hyperosmolality and hypernatremia    Findings: Comparison study dated April 3, 2017. Cardiac and  pulmonary vasculature is normal. Lungs are clear. Pleural spaces  are normal. No acute osseous abnormality.    Bony structures of abdomen reveal no acute abnormality. Soft  tissue structures normal. No pathologic calcification. Mild  diffuse colon fluid and air distention with mild caliber change  in the region of the distal descending colon sigmoid colon  region. No free intraperitoneal air. Oral contrast from recent CT  is seen within the colon.      Impression:       Impression:  1.  Mild diffuse colon fluid and air distention with mild caliber  change in the descending colon sigmoid colon region consistent  with CT demonstrated inflammatory changes and/or neoplastic  involvement in this region.  2.  Otherwise unremarkable acute abdomen series.    Electronically signed by:  Sriram Yoon MD  4/4/2017 7:50 AM CDT  Workstation: [id number]          I have reviewed the patient current medications.     Assessment/Plan     Hospital Problem List     Acute diverticulitis          Plan:    1.  Acute diverticulitis:  Dr. Velasquez is following. Continue nothing by mouth and IV antibiotics.   2. DVT prophylaxis: Patient is low risk for DVT  3. GI prophylaxis: Protonix.            Discharge Planning: I expect patient to be discharged to home in 1-2 days.    Bipin Rendon MD   04/04/17   2:12 PM

## 2017-04-04 NOTE — H&P
[facility] Medicine Admission      Date of Admission: 4/3/2017      Primary Care Physician: No Known Provider      Chief Complaint: abdominal pain    HPI: The patient is a 46-year-old gentleman who complains of abdominal pain.  He has no significant past medical history.  He has had an appendectomy in 2010.  He states that approximately 4 days prior to admission he began to have a little bit of abdominal pain that increased daily until the day of admission.  He states that on the day of admission his abdominal pain was pretty severe.  He denies fever nausea or vomiting.  He did have some diarrhea on the day of admission, but he thinks that is related to medicine that he took to try to alleviate the abdominal pain.  At the time of my exam he was more comfortable.  He wanted food, and no when he can go home.  There are no alleviating or exacerbating factors.    Concurrent Medical History: Denies concurrent problems.    Past Surgical History: Appendectomy 2010    Family History: Asthma    Social History: Denies tobacco use.  Occasional alcohol use.     Allergies: No Known Allergies    Medications: Scheduled Meds:  metroNIDAZOLE 500 mg Intravenous Once   [START ON 4/4/2017] pantoprazole 40 mg Intravenous Q AM     Continuous Infusions:  dextrose 125 mL/hr Last Rate: 125 mL/hr (04/03/17 1503)   sodium chloride 125 mL/hr Last Rate: Stopped (04/03/17 1612)     PRN Meds:.ondansetron  •  sodium chloride    Review of Systems:  Review of Systems   Constitutional: Negative for appetite change, chills, fatigue, fever and unexpected weight change.   HENT: Negative for congestion, facial swelling, hearing loss, nosebleeds, rhinorrhea, sneezing, trouble swallowing and voice change.    Eyes: Negative for photophobia and visual disturbance.   Respiratory: Negative for apnea, cough, choking, chest tightness, shortness of breath, wheezing and stridor.    Cardiovascular: Negative for chest  pain, palpitations and leg swelling.   Gastrointestinal: Positive for abdominal distention, abdominal pain and diarrhea. Negative for blood in stool, constipation, nausea and vomiting.   Endocrine: Negative for cold intolerance, heat intolerance, polydipsia, polyphagia and polyuria.   Genitourinary: Negative for dysuria, flank pain and hematuria.   Musculoskeletal: Negative for arthralgias, back pain, myalgias and neck pain.   Skin: Negative for rash and wound.   Allergic/Immunologic: Negative for immunocompromised state.   Neurological: Negative for dizziness, seizures, syncope, speech difficulty, weakness, light-headedness, numbness and headaches.   Hematological: Does not bruise/bleed easily.   Psychiatric/Behavioral: Negative for agitation, behavioral problems, confusion, decreased concentration, hallucinations, self-injury and suicidal ideas. The patient is not nervous/anxious.       Otherwise complete ROS is negative except as mentioned above.    Physical Exam:   Temp:  [98.2 °F (36.8 °C)-98.9 °F (37.2 °C)] 98.2 °F (36.8 °C)  Heart Rate:  [50-65] 51  Resp:  [18-20] 18  BP: (106-165)/(58-83) 108/69     Physical Exam   Constitutional: He is oriented to person, place, and time. He appears well-developed and well-nourished.   HENT:   Head: Normocephalic and atraumatic.   Nose: Nose normal.   Mouth/Throat: Oropharynx is clear and moist.   Eyes: Conjunctivae, EOM and lids are normal. Pupils are equal, round, and reactive to light. No scleral icterus.   Neck: Normal range of motion. Neck supple. No JVD present. No tracheal tenderness and no spinous process tenderness present. No rigidity. No tracheal deviation, no edema and normal range of motion present.   Cardiovascular: Normal rate, regular rhythm, S1 normal, S2 normal, normal heart sounds and normal pulses.  Exam reveals no gallop and no friction rub.    No murmur heard.  Pulmonary/Chest: Effort normal and breath sounds normal. No accessory muscle usage. No  respiratory distress. He has no decreased breath sounds. He has no wheezes. He has no rales. He exhibits no tenderness.   Abdominal: Soft. Bowel sounds are normal. He exhibits no distension and no mass. There is no tenderness. There is no rebound and no guarding.   Musculoskeletal: He exhibits no edema or tenderness.        Right shoulder: He exhibits normal range of motion, no tenderness and no pain.   Neurological: He is alert and oriented to person, place, and time. He has normal reflexes. He displays no atrophy and normal reflexes. No cranial nerve deficit or sensory deficit. He exhibits normal muscle tone. He displays no seizure activity. Coordination normal.   Skin: Skin is warm. No rash noted. No pallor.   Psychiatric: He has a normal mood and affect. His behavior is normal. Judgment and thought content normal.         Results Reviewed:  I have personally reviewed current lab, radiology, and data and agree with results.  Lab Results (last 24 hours)     Procedure Component Value Units Date/Time    CBC & Differential [[id number]] Collected:  04/03/17 1300    Specimen:  Blood Updated:  04/03/17 1322    Narrative:       The following orders were created for panel order CBC & Differential.  Procedure                               Abnormality         Status                     ---------                               -----------         ------                     CBC Auto Differential[[id number]]         Abnormal            Final result                 Please view results for these tests on the individual orders.    CBC Auto Differential [[id number]]  (Abnormal) Collected:  04/03/17 1300    Specimen:  Blood Updated:  04/03/17 1322     WBC 13.51 (H) 10*3/mm3      RBC 5.64 10*6/mm3      Hemoglobin 16.3 g/dL      Hematocrit 47.1 %      MCV 83.5 fL      MCH 28.9 pg      MCHC 34.6 g/dL      RDW 13.3 %      RDW-SD 40.1 fl      MPV 10.7 fL      Platelets 305 10*3/mm3      Neutrophil % 87.0 (H) %      Lymphocyte % 9.1 (L) %       Monocyte % 3.4 %      Eosinophil % 0.1 %      Basophil % 0.2 %      Immature Grans % 0.2 %      Neutrophils, Absolute 11.75 (H) 10*3/mm3      Lymphocytes, Absolute 1.23 10*3/mm3      Monocytes, Absolute 0.46 10*3/mm3      Eosinophils, Absolute 0.01 10*3/mm3      Basophils, Absolute 0.03 10*3/mm3      Immature Grans, Absolute 0.03 (H) 10*3/mm3     Comprehensive Metabolic Panel [[id number]]  (Abnormal) Collected:  04/03/17 1300    Specimen:  Blood Updated:  04/03/17 1330     Glucose 133 (H) mg/dL      BUN 16 mg/dL      Creatinine 0.75 mg/dL      Sodium 153 (H) mmol/L      Potassium 3.1 (L) mmol/L      Chloride 100 mmol/L      CO2 35.0 (H) mmol/L      Calcium 9.4 mg/dL      Total Protein 8.2 g/dL      Albumin 4.90 (H) g/dL      ALT (SGPT) 54 U/L      AST (SGOT) 36 U/L      Alkaline Phosphatase 105 U/L      Total Bilirubin 0.4 mg/dL      eGFR Non African Amer 113 mL/min/1.73      eGFR  African Amer 137 mL/min/1.73      Globulin 3.3 gm/dL      A/G Ratio 1.5 g/dL      BUN/Creatinine Ratio 21.3     Anion Gap 18.0 (H) mmol/L     Lipase [[id number]]  (Normal) Collected:  04/03/17 1300    Specimen:  Blood Updated:  04/03/17 1330     Lipase 37 U/L     Amylase [[id number]]  (Normal) Collected:  04/03/17 1300    Specimen:  Blood Updated:  04/03/17 1330     Amylase 82 U/L     CK [[id number]]  (Normal) Collected:  04/03/17 1300    Specimen:  Blood Updated:  04/03/17 1330     Creatine Kinase 65 U/L     CK-MB [[id number]]  (Normal) Collected:  04/03/17 1300    Specimen:  Blood Updated:  04/03/17 1343     CKMB 0.77 ng/mL     Troponin [[id number]]  (Normal) Collected:  04/03/17 1300    Specimen:  Blood Updated:  04/03/17 1343     Troponin I <0.012 ng/mL     Urinalysis With / Culture If Indicated [[id number]]  (Abnormal) Collected:  04/03/17 1329    Specimen:  Urine from Urine, Clean Catch Updated:  04/03/17 1426     Color, UA Yellow     Appearance, UA Cloudy (A)     pH, UA 8.5     Specific Gravity, UA 1.020     Glucose, UA Negative     Ketones, UA  Negative     Bilirubin, UA Negative     Blood, UA Negative     Protein,  mg/dL (2+) (A)     Leuk Esterase, UA Negative     Nitrite, UA Negative     Urobilinogen, UA 0.2 E.U./dL    Urinalysis, Microscopic Only [[id number]]  (Abnormal) Collected:  04/03/17 1329    Specimen:  Urine from Urine, Clean Catch Updated:  04/03/17 1426     RBC, UA 0-2 (A) /HPF      WBC, UA 0-2 /HPF      Bacteria, UA None Seen /HPF      Squamous Epithelial Cells, UA None Seen /HPF      Hyaline Casts, UA 0-2 /LPF      Methodology Automated Microscopy    [place] Draw [[id number]] Collected:  04/03/17 1300    Specimen:  Blood Updated:  04/03/17 1701    Narrative:       The following orders were created for panel order [place] Draw.  Procedure                               Abnormality         Status                     ---------                               -----------         ------                     Light Blue Top[[id number]]                                    Final result               Gold Top - SST[[id number]]                                    Final result                 Please view results for these tests on the individual orders.    Light Blue Top [[id number]] Collected:  04/03/17 1300    Specimen:  Blood Updated:  04/03/17 1701     Extra Tube hold for add-on      Auto resulted       Gold Top - SST [[id number]] Collected:  04/03/17 1300    Specimen:  Blood Updated:  04/03/17 1701     Extra Tube Hold for add-ons.      Auto resulted.           Imaging Results (last 24 hours)     Procedure Component Value Units Date/Time    XR Abdomen 2 View With Chest 1 View [[id number]] Collected:  04/03/17 1330     Updated:  04/03/17 1334    Narrative:       Patient Name:  LANCE NOBLES  Patient ID:  [id number]   Ordering:  SUN CHU  Attending:  SUN CHU  Referring:  SUN CHU  ------------------------------------------------    Abdominal series with single view chest    HISTORY: Vomiting. Nausea.    Upright PA film of the chest and  supine and upright abdominal  films obtained.  COMPARISON: None    Low lung volumes.  The lungs are clear of an acute process.  The heart is not enlarged.  The pulmonary vasculature is not increased.  No pleural effusion.  No pneumothorax.  No acute osseous abnormality.    No free air.  Gas and feces in the colon with some gaseous distention  transverse colon.  Air-fluid levels in the small and large bowel, compatible with an  adynamic ileus.  No organomegaly.  No abnormal calcifications.  Old compression deformities lumbar spine.  Minimal degenerative changes lumbar spine.  Surgical sutures right lower quadrant.      Impression:       CONCLUSION:  Adynamic ileus.  Low lung volumes.    [id number]    Electronically signed by:  Ross Cleveland MD  4/3/2017 1:33 PM CDT  Workstation: [email or web address]    CT Abdomen Pelvis With Contrast [[id number]] Collected:  04/03/17 1452     Updated:  04/03/17 1504    Narrative:       Patient Name:  LANCE NOBLES  Patient ID:  [id number]   Ordering:  SUN CHU  Attending:  SUN CHU  Referring:  SUN CHU  ------------------------------------------------  CT abdomen, pelvis with contrast.  CLINICAL INDICATION: Periumbilical abdominal pain.       COMPARISON: CT abdomen, pelvis February 11, 2010.       TECHNIQUE: Oral and nonionic IV contrast. 94 mL Isovue-300.  Helical scanning with axial and coronal reformations. Soft  tissue, lung, and bone windows reviewed. This exam was performed  using radiation doses that are As Low As Reasonably Achievable  (ALARA).This exam was performed according to our departmental  dose optimization program, which includes automated exposure  control, adjustment of the mA and/or KV according to patient size  and/or use of iterative reconstruction technique.    ABDOMEN CT FINDINGS: The visualized lung bases show minor  dependent changes. Contrast is noted within the distal esophagus  suggesting gastroesophageal reflux. Liver, gallbladder,  spleen,  pancreas, adrenal glands and kidneys are normal in appearance.     The colon is very dilated. The cecum 8.73 cm.   The colon is filled with fluid and stool. There is an area of  luminal narrowing and pericolonic inflammatory changes in the  sigmoid colon series 2 images 75-77. This may represent an  obstructing sigmoid neoplasm versus diverticulitis. Colonoscopy  should be considered for further evaluation.    No evidence of pathologically enlarged nodes, free air, or free  fluid. Degenerative changes of the spine.  The bones are grossly unremarkable.    PELVIS CT FINDINGS: No pelvic masses.    No evidence of  pathologically enlarged nodes, free air, or free fluid.     The bones are grossly unremarkable.      Impression:       CONCLUSION: Dilated colon filled with fluid and stool . Area of  luminal narrowing and pericolonic inflammatory changes sigmoid  colon. Differential considerations would include an obstructing  sigmoid neoplasm versus diverticulitis. CT abdomen, pelvis with  contrast is otherwise unremarkable.    Electronically signed by:  Kvng Rasmussen MD  4/3/2017 3:03 PM CDT  Workstation: [id number]    FL Barium Enema Water Soluble [[id number]] Collected:  04/03/17 1657     Updated:  04/03/17 1704    Narrative:       Patient Name:  LANCE NOBLES  Patient ID:  [id number]   Ordering:  VICTORIA ALCAZAR  Attending:  SUN CHU  Referring:  VICTORIA ALCAZAR  ------------------------------------------------    Hypaque enema.    HISTORY: Colonic obstruction.    Preliminary film demonstrates contrast within the large and small  bowel from recent contrast CT exam.    1.24 minutes of fluoroscopy was used. Two bottles of Gastroview  was used. Seven images were obtained.    There is unobstructed flow of contrast from the rectum to the mid  descending colon. No discrete colonic obstruction is appreciated  on the current exam.    There is mucosal edema in the sigmoid colon corresponding to the  abnormality seen on CT  exam.      Impression:       CONCLUSION:    No evidence of colonic obstruction. Mucosal edema and spasm in  the sigmoid colon at the site of abnormality seen on CT  examination. These findings therefore suggest a diagnosis of  diverticulitis as more likely than colonic neoplasm.    Electronically signed by:  Kvng Rasmussen MD  4/3/2017 5:03 PM CDT  Workstation: [email or web address]            Assessment:    Hospital Problem List     Acute diverticulitis                Plan:  1.  Acute diverticulitis: Barium enema suggested diverticulitis over neoplasm.  Dr. Haile has been consult.  Dr. Velasquez is also following.  Continue nothing by mouth and IV antibiotics.    2.  DVT prophylaxis: Patient is low risk for DVT  3.  GI prophylaxis: Protonix.      I discussed the patients findings and my recommendations with: the patient    Bipin Rendon MD  04/03/17  8:48 PM

## 2017-04-04 NOTE — PROGRESS NOTES
LOS: 1 day   Patient Care Team:  No Known Provider as PCP - General    Chief Complaint:  <principal problem not specified>    Subjective     Interval History:   Pain better, no n/v, +BM    Objective     Vital Signs  Temp:  [97.9 °F (36.6 °C)-98.9 °F (37.2 °C)] 98.3 °F (36.8 °C)  Heart Rate:  [50-65] 56  Resp:  [18-20] 18  BP: (101-165)/(58-83) 109/68    Physical Exam:  Less distention, no focal tenderness     Results Review:       Lab Results (last 24 hours)     Procedure Component Value Units Date/Time    CBC & Differential [[id number]] Collected:  04/03/17 1300    Specimen:  Blood Updated:  04/03/17 1322    Narrative:       The following orders were created for panel order CBC & Differential.  Procedure                               Abnormality         Status                     ---------                               -----------         ------                     CBC Auto Differential[[id number]]         Abnormal            Final result                 Please view results for these tests on the individual orders.    CBC Auto Differential [[id number]]  (Abnormal) Collected:  04/03/17 1300    Specimen:  Blood Updated:  04/03/17 1322     WBC 13.51 (H) 10*3/mm3      RBC 5.64 10*6/mm3      Hemoglobin 16.3 g/dL      Hematocrit 47.1 %      MCV 83.5 fL      MCH 28.9 pg      MCHC 34.6 g/dL      RDW 13.3 %      RDW-SD 40.1 fl      MPV 10.7 fL      Platelets 305 10*3/mm3      Neutrophil % 87.0 (H) %      Lymphocyte % 9.1 (L) %      Monocyte % 3.4 %      Eosinophil % 0.1 %      Basophil % 0.2 %      Immature Grans % 0.2 %      Neutrophils, Absolute 11.75 (H) 10*3/mm3      Lymphocytes, Absolute 1.23 10*3/mm3      Monocytes, Absolute 0.46 10*3/mm3      Eosinophils, Absolute 0.01 10*3/mm3      Basophils, Absolute 0.03 10*3/mm3      Immature Grans, Absolute 0.03 (H) 10*3/mm3     Comprehensive Metabolic Panel [[id number]]  (Abnormal) Collected:  04/03/17 1300    Specimen:  Blood Updated:  04/03/17 1330     Glucose 133 (H) mg/dL       BUN 16 mg/dL      Creatinine 0.75 mg/dL      Sodium 153 (H) mmol/L      Potassium 3.1 (L) mmol/L      Chloride 100 mmol/L      CO2 35.0 (H) mmol/L      Calcium 9.4 mg/dL      Total Protein 8.2 g/dL      Albumin 4.90 (H) g/dL      ALT (SGPT) 54 U/L      AST (SGOT) 36 U/L      Alkaline Phosphatase 105 U/L      Total Bilirubin 0.4 mg/dL      eGFR Non African Amer 113 mL/min/1.73      eGFR  African Amer 137 mL/min/1.73      Globulin 3.3 gm/dL      A/G Ratio 1.5 g/dL      BUN/Creatinine Ratio 21.3     Anion Gap 18.0 (H) mmol/L     Lipase [[id number]]  (Normal) Collected:  04/03/17 1300    Specimen:  Blood Updated:  04/03/17 1330     Lipase 37 U/L     Amylase [[id number]]  (Normal) Collected:  04/03/17 1300    Specimen:  Blood Updated:  04/03/17 1330     Amylase 82 U/L     CK [[id number]]  (Normal) Collected:  04/03/17 1300    Specimen:  Blood Updated:  04/03/17 1330     Creatine Kinase 65 U/L     CK-MB [[id number]]  (Normal) Collected:  04/03/17 1300    Specimen:  Blood Updated:  04/03/17 1343     CKMB 0.77 ng/mL     Troponin [[id number]]  (Normal) Collected:  04/03/17 1300    Specimen:  Blood Updated:  04/03/17 1343     Troponin I <0.012 ng/mL     Urinalysis With / Culture If Indicated [[id number]]  (Abnormal) Collected:  04/03/17 1329    Specimen:  Urine from Urine, Clean Catch Updated:  04/03/17 1426     Color, UA Yellow     Appearance, UA Cloudy (A)     pH, UA 8.5     Specific Gravity, UA 1.020     Glucose, UA Negative     Ketones, UA Negative     Bilirubin, UA Negative     Blood, UA Negative     Protein,  mg/dL (2+) (A)     Leuk Esterase, UA Negative     Nitrite, UA Negative     Urobilinogen, UA 0.2 E.U./dL    Urinalysis, Microscopic Only [[id number]]  (Abnormal) Collected:  04/03/17 1329    Specimen:  Urine from Urine, Clean Catch Updated:  04/03/17 1426     RBC, UA 0-2 (A) /HPF      WBC, UA 0-2 /HPF      Bacteria, UA None Seen /HPF      Squamous Epithelial Cells, UA None Seen /HPF      Hyaline Casts, UA 0-2 /LPF       Methodology Automated Microscopy    [place] Draw [[id number]] Collected:  04/03/17 1300    Specimen:  Blood Updated:  04/03/17 1701    Narrative:       The following orders were created for panel order [place] Draw.  Procedure                               Abnormality         Status                     ---------                               -----------         ------                     Light Blue Top[[id number]]                                    Final result               Gold Top - SST[[id number]]                                    Final result                 Please view results for these tests on the individual orders.    Light Blue Top [[id number]] Collected:  04/03/17 1300    Specimen:  Blood Updated:  04/03/17 1701     Extra Tube hold for add-on      Auto resulted       Gold Top - SST [[id number]] Collected:  04/03/17 1300    Specimen:  Blood Updated:  04/03/17 1701     Extra Tube Hold for add-ons.      Auto resulted.       CBC & Differential [[id number]] Collected:  04/04/17 0707    Specimen:  Blood Updated:  04/04/17 0744    Narrative:       The following orders were created for panel order CBC & Differential.  Procedure                               Abnormality         Status                     ---------                               -----------         ------                     CBC Auto Differential[[id number]]         Normal              Final result                 Please view results for these tests on the individual orders.    CBC Auto Differential [[id number]]  (Normal) Collected:  04/04/17 0707    Specimen:  Blood Updated:  04/04/17 0744     WBC 6.79 10*3/mm3      RBC 4.72 10*6/mm3      Hemoglobin 13.7 g/dL      Hematocrit 39.8 %      MCV 84.3 fL      MCH 29.0 pg      MCHC 34.4 g/dL      RDW 13.5 %      RDW-SD 41.1 fl      MPV 11.1 fL      Platelets 247 10*3/mm3      Neutrophil % 68.6 %      Lymphocyte % 24.4 %      Monocyte % 5.3 %      Eosinophil % 1.2 %      Basophil % 0.4 %      Immature Grans % 0.1 %       Neutrophils, Absolute 4.65 10*3/mm3      Lymphocytes, Absolute 1.66 10*3/mm3      Monocytes, Absolute 0.36 10*3/mm3      Eosinophils, Absolute 0.08 10*3/mm3      Basophils, Absolute 0.03 10*3/mm3      Immature Grans, Absolute 0.01 10*3/mm3     Basic Metabolic Panel [[id number]]  (Abnormal) Collected:  04/04/17 0706    Specimen:  Blood Updated:  04/04/17 0753     Glucose 107 (H) mg/dL      BUN 13 mg/dL      Creatinine 0.68 (L) mg/dL      Sodium 145 mmol/L      Potassium 3.4 (L) mmol/L      Chloride 102 mmol/L      CO2 29.0 mmol/L      Calcium 8.3 (L) mg/dL      eGFR  African Amer 154 (H) mL/min/1.73      eGFR Non African Amer 127 mL/min/1.73      BUN/Creatinine Ratio 19.1     Anion Gap 14.0 mmol/L           Medication Review:   Current Facility-Administered Medications   Medication Dose Route Frequency Provider Last Rate Last Dose   • dextrose (D5W) 5 % infusion  125 mL/hr Intravenous Continuous Juliette Lacy  mL/hr at 04/04/17 0616 125 mL/hr at 04/04/17 0616   • ondansetron (ZOFRAN) injection 4 mg  4 mg Intravenous Q6H PRN Bipin Rendon MD       • pantoprazole (PROTONIX) injection 40 mg  40 mg Intravenous Q AM Bipin Rendon MD   40 mg at 04/04/17 0616   • sodium chloride 0.9 % flush 1-10 mL  1-10 mL Intravenous PRN Bipin Rendon MD       AAS shows less stool and more air is colon which is still somewhat dilated    Assessment/Plan     Active Problems:    Acute diverticulitis    47 yo man HD#1 with diverticulitis causing partial large bowel obstruction  Continue supportive care with IV abx, NPO and serial abd exams  Is having BM but still some contrast in the colon  Will continue to monitor, no operation at this time      Bereket Velasquez MD  04/04/17  8:33 AM

## 2017-04-04 NOTE — PLAN OF CARE
Problem: Patient Care Overview (Adult)  Goal: Plan of Care Review  Outcome: Ongoing (interventions implemented as appropriate)  Goal: Adult Individualization and Mutuality  Outcome: Ongoing (interventions implemented as appropriate)  Goal: Discharge Needs Assessment  Outcome: Ongoing (interventions implemented as appropriate)    Problem: Pain, Acute (Adult)  Goal: Identify Related Risk Factors and Signs and Symptoms  Outcome: Ongoing (interventions implemented as appropriate)  Goal: Acceptable Pain Control/Comfort Level  Outcome: Ongoing (interventions implemented as appropriate)    Problem: Fluid Volume Excess (Adult,Obstetrics,Pediatric)  Goal: Identify Related Risk Factors and Signs and Symptoms  Outcome: Ongoing (interventions implemented as appropriate)  Goal: Stable Weight  Outcome: Ongoing (interventions implemented as appropriate)  Goal: Balanced Intake/Output  Outcome: Ongoing (interventions implemented as appropriate)

## 2017-04-05 LAB
ANION GAP SERPL CALCULATED.3IONS-SCNC: 10 MMOL/L (ref 5–15)
BASOPHILS # BLD AUTO: 0.03 10*3/MM3 (ref 0–0.2)
BASOPHILS NFR BLD AUTO: 0.6 % (ref 0–2)
BUN BLD-MCNC: 10 MG/DL (ref 7–21)
BUN/CREAT SERPL: 13.7 (ref 7–25)
CALCIUM SPEC-SCNC: 8.3 MG/DL (ref 8.4–10.2)
CHLORIDE SERPL-SCNC: 99 MMOL/L (ref 95–110)
CO2 SERPL-SCNC: 30 MMOL/L (ref 22–31)
CREAT BLD-MCNC: 0.73 MG/DL (ref 0.7–1.3)
DEPRECATED RDW RBC AUTO: 38.8 FL (ref 35.1–43.9)
EOSINOPHIL # BLD AUTO: 0.12 10*3/MM3 (ref 0–0.7)
EOSINOPHIL NFR BLD AUTO: 2.5 % (ref 0–7)
ERYTHROCYTE [DISTWIDTH] IN BLOOD BY AUTOMATED COUNT: 12.9 % (ref 11.5–14.5)
GFR SERPL CREATININE-BSD FRML MDRD: 117 ML/MIN/1.73 (ref 60–147)
GFR SERPL CREATININE-BSD FRML MDRD: 141 ML/MIN/1.73 (ref 63–147)
GLUCOSE BLD-MCNC: 101 MG/DL (ref 60–100)
HCT VFR BLD AUTO: 38.2 % (ref 39–49)
HGB BLD-MCNC: 13.1 G/DL (ref 13.7–17.3)
IMM GRANULOCYTES # BLD: 0.01 10*3/MM3 (ref 0–0.02)
IMM GRANULOCYTES NFR BLD: 0.2 % (ref 0–0.5)
LYMPHOCYTES # BLD AUTO: 1.72 10*3/MM3 (ref 0.6–4.2)
LYMPHOCYTES NFR BLD AUTO: 35.4 % (ref 10–50)
MCH RBC QN AUTO: 28.5 PG (ref 26.5–34)
MCHC RBC AUTO-ENTMCNC: 34.3 G/DL (ref 31.5–36.3)
MCV RBC AUTO: 83.2 FL (ref 80–98)
MONOCYTES # BLD AUTO: 0.2 10*3/MM3 (ref 0–0.9)
MONOCYTES NFR BLD AUTO: 4.1 % (ref 0–12)
NEUTROPHILS # BLD AUTO: 2.78 10*3/MM3 (ref 2–8.6)
NEUTROPHILS NFR BLD AUTO: 57.2 % (ref 37–80)
PLATELET # BLD AUTO: 236 10*3/MM3 (ref 150–450)
PMV BLD AUTO: 11 FL (ref 8–12)
POTASSIUM BLD-SCNC: 3.3 MMOL/L (ref 3.5–5.1)
RBC # BLD AUTO: 4.59 10*6/MM3 (ref 4.37–5.74)
SODIUM BLD-SCNC: 139 MMOL/L (ref 137–145)
WBC NRBC COR # BLD: 4.86 10*3/MM3 (ref 3.2–9.8)

## 2017-04-05 PROCEDURE — 99232 SBSQ HOSP IP/OBS MODERATE 35: CPT | Performed by: SURGERY

## 2017-04-05 PROCEDURE — 80048 BASIC METABOLIC PNL TOTAL CA: CPT | Performed by: HOSPITALIST

## 2017-04-05 PROCEDURE — 85025 COMPLETE CBC W/AUTO DIFF WBC: CPT | Performed by: HOSPITALIST

## 2017-04-05 PROCEDURE — 25010000002 LEVOFLOXACIN PER 250 MG: Performed by: PHYSICIAN ASSISTANT

## 2017-04-05 RX ORDER — POTASSIUM CHLORIDE 20 MEQ/1
40 TABLET, EXTENDED RELEASE ORAL ONCE
Status: DISCONTINUED | OUTPATIENT
Start: 2017-04-05 | End: 2017-04-05 | Stop reason: CLARIF

## 2017-04-05 RX ORDER — POTASSIUM CHLORIDE 750 MG/1
40 CAPSULE, EXTENDED RELEASE ORAL ONCE
Status: COMPLETED | OUTPATIENT
Start: 2017-04-05 | End: 2017-04-05

## 2017-04-05 RX ORDER — LEVOFLOXACIN 5 MG/ML
750 INJECTION, SOLUTION INTRAVENOUS EVERY 24 HOURS
Status: DISCONTINUED | OUTPATIENT
Start: 2017-04-05 | End: 2017-04-06 | Stop reason: HOSPADM

## 2017-04-05 RX ADMIN — MAGNESIUM OXIDE TAB 400 MG (241.3 MG ELEMENTAL MG) 1000 MG: 400 (241.3 MG) TAB at 14:38

## 2017-04-05 RX ADMIN — POTASSIUM CHLORIDE 40 MEQ: 750 CAPSULE, EXTENDED RELEASE ORAL at 14:39

## 2017-04-05 RX ADMIN — DEXTROSE MONOHYDRATE 125 ML/HR: 50 INJECTION, SOLUTION INTRAVENOUS at 14:37

## 2017-04-05 RX ADMIN — DEXTROSE MONOHYDRATE 125 ML/HR: 50 INJECTION, SOLUTION INTRAVENOUS at 06:30

## 2017-04-05 RX ADMIN — LEVOFLOXACIN 750 MG: 5 INJECTION, SOLUTION INTRAVENOUS at 14:38

## 2017-04-05 RX ADMIN — PANTOPRAZOLE SODIUM 40 MG: 40 INJECTION, POWDER, FOR SOLUTION INTRAVENOUS at 05:50

## 2017-04-05 RX ADMIN — METRONIDAZOLE 500 MG: 500 INJECTION, SOLUTION INTRAVENOUS at 15:44

## 2017-04-05 RX ADMIN — METRONIDAZOLE 500 MG: 500 INJECTION, SOLUTION INTRAVENOUS at 20:38

## 2017-04-05 NOTE — PROGRESS NOTES
LOS: 2 days   Patient Care Team:  No Known Provider as PCP - General    Chief Complaint:  <principal problem not specified>    Subjective     Interval History:   Much improved, no pain, no n/v, +bm    Objective     Vital Signs  Temp:  [97.1 °F (36.2 °C)-98.6 °F (37 °C)] 97.1 °F (36.2 °C)  Heart Rate:  [46-53] 53  Resp:  [18] 18  BP: (103-117)/(55-68) 103/56    Physical Exam:  No distention or focal tenderness     Results Review:       Lab Results (last 24 hours)     Procedure Component Value Units Date/Time    Basic Metabolic Panel [[id number]]  (Abnormal) Collected:  04/04/17 0706    Specimen:  Blood Updated:  04/04/17 0753     Glucose 107 (H) mg/dL      BUN 13 mg/dL      Creatinine 0.68 (L) mg/dL      Sodium 145 mmol/L      Potassium 3.4 (L) mmol/L      Chloride 102 mmol/L      CO2 29.0 mmol/L      Calcium 8.3 (L) mg/dL      eGFR  African Amer 154 (H) mL/min/1.73      eGFR Non African Amer 127 mL/min/1.73      BUN/Creatinine Ratio 19.1     Anion Gap 14.0 mmol/L     CBC & Differential [[id number]] Collected:  04/05/17 0600    Specimen:  Blood Updated:  04/05/17 0640    Narrative:       The following orders were created for panel order CBC & Differential.  Procedure                               Abnormality         Status                     ---------                               -----------         ------                     CBC Auto Differential[[id number]]         Abnormal            Final result                 Please view results for these tests on the individual orders.    CBC Auto Differential [[id number]]  (Abnormal) Collected:  04/05/17 0600    Specimen:  Blood Updated:  04/05/17 0640     WBC 4.86 10*3/mm3      RBC 4.59 10*6/mm3      Hemoglobin 13.1 (L) g/dL      Hematocrit 38.2 (L) %      MCV 83.2 fL      MCH 28.5 pg      MCHC 34.3 g/dL      RDW 12.9 %      RDW-SD 38.8 fl      MPV 11.0 fL      Platelets 236 10*3/mm3      Neutrophil % 57.2 %      Lymphocyte % 35.4 %      Monocyte % 4.1 %      Eosinophil % 2.5  %      Basophil % 0.6 %      Immature Grans % 0.2 %      Neutrophils, Absolute 2.78 10*3/mm3      Lymphocytes, Absolute 1.72 10*3/mm3      Monocytes, Absolute 0.20 10*3/mm3      Eosinophils, Absolute 0.12 10*3/mm3      Basophils, Absolute 0.03 10*3/mm3      Immature Grans, Absolute 0.01 10*3/mm3     Basic Metabolic Panel [[id number]]  (Abnormal) Collected:  04/05/17 0600    Specimen:  Blood Updated:  04/05/17 0700     Glucose 101 (H) mg/dL      BUN 10 mg/dL      Creatinine 0.73 mg/dL      Sodium 139 mmol/L      Potassium 3.3 (L) mmol/L      Chloride 99 mmol/L      CO2 30.0 mmol/L      Calcium 8.3 (L) mg/dL      eGFR   Amer 141 mL/min/1.73      eGFR Non African Amer 117 mL/min/1.73      BUN/Creatinine Ratio 13.7     Anion Gap 10.0 mmol/L           Medication Review:   Current Facility-Administered Medications   Medication Dose Route Frequency Provider Last Rate Last Dose   • dextrose (D5W) 5 % infusion  125 mL/hr Intravenous Continuous Juliette Lacy  mL/hr at 04/05/17 0630 125 mL/hr at 04/05/17 0630   • ondansetron (ZOFRAN) injection 4 mg  4 mg Intravenous Q6H PRN Bipin Rendon MD       • pantoprazole (PROTONIX) injection 40 mg  40 mg Intravenous Q AM Bipin Rendon MD   40 mg at 04/05/17 0550   • sodium chloride 0.9 % flush 1-10 mL  1-10 mL Intravenous PRN Bipin Rendon MD           Assessment/Plan     Active Problems:    Acute diverticulitis    45 yo man HD#2 with diverticulitis causing partial large bowel obstruction (improving)  Continue supportive care with IV abx and serial abd exams  Start clear liquid diet  Will continue to monitor, no operation at this time    Bereket Velasquez MD  04/05/17  7:47 AM

## 2017-04-05 NOTE — PLAN OF CARE
Problem: Patient Care Overview (Adult)  Goal: Plan of Care Review  Outcome: Ongoing (interventions implemented as appropriate)  Goal: Adult Individualization and Mutuality  Outcome: Ongoing (interventions implemented as appropriate)  Goal: Discharge Needs Assessment  Outcome: Ongoing (interventions implemented as appropriate)    04/05/17 1603   Discharge Needs Assessment   Concerns To Be Addressed no discharge needs identified

## 2017-04-05 NOTE — PROGRESS NOTES
Antwon Haile DO,[facility]  Gastroenterology  Hepatology  Endoscopy  Board Certified in Internal Medicine and gastroenterology  [address], [address]  [place]. [address]  T- [phone]   F - [phone]     GASTROENTEROLOGY PROGRESS NOTE   NATWON HAILE DO.         SUBJECTIVE:   4/5/2017  Chief Complaint:     Subjective  : Left lower quadrant pain, improved    Patient is 46 y.o. male presents with acute diverticulitis.  The patient's pain is much improved.  There has been no further problems with the abdominal distention as the patient has had before.  The patient denies any problems with any fevers or chills.  There has been no evidence of any difficulties with the liquid diet.  The patient has had no fevers or chills.  There has been no nausea or vomiting..      CURRENT MEDICATIONS/OBJECTIVE/VS/PE:     Current Medications:     Current Facility-Administered Medications   Medication Dose Route Frequency Provider Last Rate Last Dose   • dextrose (D5W) 5 % infusion  125 mL/hr Intravenous Continuous Juliette Lacy  mL/hr at 04/05/17 1437 125 mL/hr at 04/05/17 1437   • levoFLOXacin (LEVAQUIN) 750 mg/150 mL D5W (premix) (LEVAQUIN) 750 mg  750 mg Intravenous Q24H YONI Heredia   750 mg at 04/05/17 1438   • magnesium oxide (MAGOX) half tablet 1,000 mg  1,000 mg Oral Daily YONI Heredia   1,000 mg at 04/05/17 1438   • metroNIDAZOLE (FLAGYL) IVPB 500 mg  500 mg Intravenous Q8H YONI Heredia   500 mg at 04/05/17 1544   • ondansetron (ZOFRAN) injection 4 mg  4 mg Intravenous Q6H PRN Bipin Rendon MD       • pantoprazole (PROTONIX) injection 40 mg  40 mg Intravenous Q AM Bipin Rendon MD   40 mg at 04/05/17 0550   • sodium chloride 0.9 % flush 1-10 mL  1-10 mL Intravenous PRN Bipin Rendon MD           Objective     Physical Exam:   Temp:  [96.4 °F (35.8 °C)-98.6 °F (37 °C)] 97.6 °F (36.4 °C)  Heart Rate:  [46-53] 50  Resp:  [18] 18  BP: (040-111)/53-04) 111/53      Physical Exam:  General Appearance:    Alert, cooperative, in no acute distress   Head:    Normocephalic, without obvious abnormality, atraumatic   Eyes:            Lids and lashes normal, conjunctivae and sclerae normal, no   icterus, no pallor, corneas clear, PERRLA   Ears:    Ears appear intact with no abnormalities noted   Throat:   No oral lesions, no thrush, oral mucosa moist   Neck:   No adenopathy, supple, trachea midline, no thyromegaly, no     carotid bruit, no JVD   Back:     No kyphosis present, no scoliosis present, no skin lesions,       erythema or scars, no tenderness to percussion or                   palpation,   range of motion normal   Lungs:     Clear to auscultation,respirations regular, even and                   unlabored    Heart:    Regular rhythm and normal rate, normal S1 and S2, no            murmur, no gallop, no rub, no click   Breast Exam:    Deferred   Abdomen:     Normal bowel sounds, no masses, no organomegaly, soft        non-tender, non-distended, no guarding, no rebound                 tenderness   Genitalia:    Deferred   Extremities:   Moves all extremities well, no edema, no cyanosis, no              redness   Pulses:   Pulses palpable and equal bilaterally   Skin:   No bleeding, bruising or rash   Lymph nodes:   No palpable adenopathy   Neurologic:   Cranial nerves 2 - 12 grossly intact, sensation intact, DTR        present and equal bilaterally      Results Review:     Lab Results (last 24 hours)     Procedure Component Value Units Date/Time    CBC & Differential [[id number]] Collected:  04/05/17 0600    Specimen:  Blood Updated:  04/05/17 0640    Narrative:       The following orders were created for panel order CBC & Differential.  Procedure                               Abnormality         Status                     ---------                               -----------         ------                     CBC Auto Differential[[id number]]         Abnormal            Final  result                 Please view results for these tests on the individual orders.    CBC Auto Differential [[id number]]  (Abnormal) Collected:  04/05/17 0600    Specimen:  Blood Updated:  04/05/17 0640     WBC 4.86 10*3/mm3      RBC 4.59 10*6/mm3      Hemoglobin 13.1 (L) g/dL      Hematocrit 38.2 (L) %      MCV 83.2 fL      MCH 28.5 pg      MCHC 34.3 g/dL      RDW 12.9 %      RDW-SD 38.8 fl      MPV 11.0 fL      Platelets 236 10*3/mm3      Neutrophil % 57.2 %      Lymphocyte % 35.4 %      Monocyte % 4.1 %      Eosinophil % 2.5 %      Basophil % 0.6 %      Immature Grans % 0.2 %      Neutrophils, Absolute 2.78 10*3/mm3      Lymphocytes, Absolute 1.72 10*3/mm3      Monocytes, Absolute 0.20 10*3/mm3      Eosinophils, Absolute 0.12 10*3/mm3      Basophils, Absolute 0.03 10*3/mm3      Immature Grans, Absolute 0.01 10*3/mm3     Basic Metabolic Panel [[id number]]  (Abnormal) Collected:  04/05/17 0600    Specimen:  Blood Updated:  04/05/17 0700     Glucose 101 (H) mg/dL      BUN 10 mg/dL      Creatinine 0.73 mg/dL      Sodium 139 mmol/L      Potassium 3.3 (L) mmol/L      Chloride 99 mmol/L      CO2 30.0 mmol/L      Calcium 8.3 (L) mg/dL      eGFR   Amer 141 mL/min/1.73      eGFR Non African Amer 117 mL/min/1.73      BUN/Creatinine Ratio 13.7     Anion Gap 10.0 mmol/L            I reviewed the patient's new clinical results.  I reviewed the patient's new imaging results and agree with the interpretation.     ASSESSMENT/PLAN:   ASSESSMENT:   1.  Acute diverticulitis without bleeding or perforation with partial obstruction    PLAN:   1.  Low fiber diet  2.  Continue intravenous antibiotics  3.  Colonoscopy in  6 to 8 weeks      Antwon Haile DO  04/05/17  4:48 PM

## 2017-04-05 NOTE — PLAN OF CARE
Problem: Patient Care Overview (Adult)  Goal: Plan of Care Review  Outcome: Ongoing (interventions implemented as appropriate)  Goal: Adult Individualization and Mutuality  Outcome: Ongoing (interventions implemented as appropriate)  Goal: Discharge Needs Assessment  Outcome: Ongoing (interventions implemented as appropriate)    Problem: Pain, Acute (Adult)  Goal: Identify Related Risk Factors and Signs and Symptoms  Outcome: Ongoing (interventions implemented as appropriate)  Goal: Acceptable Pain Control/Comfort Level  Outcome: Ongoing (interventions implemented as appropriate)    Problem: Fluid Volume Excess (Adult,Obstetrics,Pediatric)  Goal: Identify Related Risk Factors and Signs and Symptoms  Outcome: Ongoing (interventions implemented as appropriate)  Goal: Stable Weight  Outcome: Ongoing (interventions implemented as appropriate)

## 2017-04-05 NOTE — PROGRESS NOTES
.      [facility] Medicine Services  INPATIENT PROGRESS NOTE    Length of Stay: 2  Date of Admission: 4/3/2017  Primary Care Physician: No Known Provider    Subjective   Chief Complaint/HPI:  This 46-year-old [place] American male was made to hospitalist services secondary to abdominal pain.  CT findings included differentials of diverticulitis versus obstruction.  Dr. Velasquez of general surgery is following and has recommended serial films and progressing diet to clear liquids.  Patient denies pain and states that he feels well.  Patient is currently on Levaquin and Flagyl.  Tolerating clear liquid diet with no nausea, vomiting, diarrhea, or abdominal pain.    Review of Systems   Constitutional: Negative for chills and fever.   Respiratory: Negative for cough, chest tightness, shortness of breath and wheezing.    Cardiovascular: Negative for chest pain.   Gastrointestinal: Negative for abdominal pain, diarrhea, nausea and vomiting.   Genitourinary: Negative for dysuria.   Neurological: Negative for dizziness, syncope, weakness and light-headedness.        All pertinent negatives and positives are as above. All other systems have been reviewed and are negative unless otherwise stated.     Objective    Temp:  [96.4 °F (35.8 °C)-98.6 °F (37 °C)] 97.6 °F (36.4 °C)  Heart Rate:  [46-53] 50  Resp:  [18] 18  BP: (103-111)/(53-68) 111/53    Physical Exam   Constitutional: He is oriented to person, place, and time. He appears well-developed and well-nourished.   HENT:   Head: Normocephalic.   Eyes: Conjunctivae are normal.   Pulmonary/Chest: Effort normal and breath sounds normal. No respiratory distress. He has no wheezes. He has no rales.   Abdominal: Soft. Bowel sounds are normal. He exhibits no distension. There is no tenderness. There is no rebound and no guarding.   Neurological: He is alert and oriented to person, place, and time.   Skin: Skin is warm and dry.   Psychiatric: He has  a normal mood and affect. His behavior is normal. Thought content normal.     Results Review:  I have reviewed the labs, radiology results, and diagnostic studies.    Laboratory Data:     Results from last 7 days  Lab Units 04/05/17  0600 04/04/17  0706 04/03/17  1300   SODIUM mmol/L 139 145 153*   POTASSIUM mmol/L 3.3* 3.4* 3.1*   CHLORIDE mmol/L 99 102 100   TOTAL CO2 mmol/L 30.0 29.0 35.0*   BUN mg/dL 10 13 16   CREATININE mg/dL 0.73 0.68* 0.75   GLUCOSE mg/dL 101* 107* 133*   CALCIUM mg/dL 8.3* 8.3* 9.4   BILIRUBIN mg/dL  --   --  0.4   ALK PHOS U/L  --   --  105   ALT (SGPT) U/L  --   --  54   AST (SGOT) U/L  --   --  36   ANION GAP mmol/L 10.0 14.0 18.0*     Estimated Creatinine Clearance: 118.5 mL/min (by C-G formula based on Cr of 0.73).            Results from last 7 days  Lab Units 04/05/17  0600 04/04/17  0707 04/03/17  1300   WBC 10*3/mm3 4.86 6.79 13.51*   HEMOGLOBIN g/dL 13.1* 13.7 16.3   HEMATOCRIT % 38.2* 39.8 47.1   PLATELETS 10*3/mm3 236 247 305           Culture Data:   No results found for: BLOODCX  No results found for: URINECX  No results found for: RESPCX  No results found for: WOUNDCX  No results found for: STOOLCX  No components found for: BODYFLD    Radiology Data:   Imaging Results (last 24 hours)     ** No results found for the last 24 hours. **          I have reviewed the patient current medications.     Assessment/Plan     Hospital Problem List     Acute diverticulitis          Plan:  Replete potassium, per general surgery recommendations.      YONI Heredia   04/05/17   1:22 PM

## 2017-04-05 NOTE — CONSULTS
Julia Can DO,Psychiatric  Gastroenterology  Hepatology  Endoscopy  Board Certified in Internal Medicine and gastroenterology  [address], [address]  [place]. [address]  - [phone]   F - [phone]     GASTROENTEROLOGY CONSULT NOTE   JULIA CAN DO.         SUBJECTIVE:   4/4/2017    Name: Rancho Mehta  DOD: 1970    REASON FOR CONSULT: Abnormal CT scan with abdominal pain.  Concern regarding obstruction.  Dr. Velasquez requested second opinion regarding this.  Consideration of a colonoscopy.    Chief Complaint:     Chief Complaint   Patient presents with   • Abdominal Pain       Subjective : Left lower quadrant pain for 1 week with inability to have a effective bowel movement.    Patient is 46 y.o. male presents with no prior history of any significant GI issues.  He presents with a little 1 week history of left lower quadrant pain.  It has been progressive.  It has gotten progressively worse to the point in time that he was not able to have a good bowel movement.  He thought his pain was related to constipation.  He took milk of magnesia or magnesium citrate.  With this, he had developed increasing amounts of abdominal pain.  He came to the hospital where he was found to have inflammatory changes involving the sigmoid colon with upstream dilation.  The patient has had no fevers or chills.  There has been no rectal bleeding.  There has been no weight loss.  There is no family history of colon cancer.    I was called regarding this.  I reviewed the CT scan.  I recommended a Hypaque enema.  The Hypaque enema does not show a obstructing neoplasm but shows edema of the colon consistent with diverticulitis.  Intravenous antibiotics have been started.  The patient is improving.    He feels much better over the last 24 hours.  The pain has essentially resolved..      ROS/HISTORY/ CURRENT MEDICATIONS/OBJECTIVE/VS/PE:   Review of Systems:   Review of Systems   Constitutional: Negative.    HENT:  Negative.    Eyes: Negative.    Respiratory: Negative.    Cardiovascular: Negative.    Gastrointestinal: Positive for abdominal distention, abdominal pain and constipation.   Endocrine: Negative.    Genitourinary: Negative.    Musculoskeletal: Positive for arthralgias, back pain, myalgias and neck pain.   Skin: Negative.    Allergic/Immunologic: Negative.    Neurological: Negative.    Hematological: Negative.    Psychiatric/Behavioral: Negative.        History:   History reviewed. No pertinent past medical history.  Past Surgical History:   Procedure Laterality Date   • APPENDECTOMY       History reviewed. No pertinent family history.  Social History   Substance Use Topics   • Smoking status: Never Smoker   • Smokeless tobacco: None   • Alcohol use No     Prescriptions Prior to Admission   Medication Sig Dispense Refill Last Dose   • magnesium hydroxide (MILK OF MAGNESIA) 2400 MG/10ML suspension suspension Take 10 mL by mouth Daily As Needed.   4/3/2017 at Unknown time     Allergies:  Review of patient's allergies indicates no known allergies.    I have reviewed the patients medical history, surgical history and family history in the available medical record system.     Current Medications:     Current Facility-Administered Medications   Medication Dose Route Frequency Provider Last Rate Last Dose   • dextrose (D5W) 5 % infusion  125 mL/hr Intravenous Continuous Juliette Lacy  mL/hr at 04/04/17 1451 125 mL/hr at 04/04/17 1451   • ondansetron (ZOFRAN) injection 4 mg  4 mg Intravenous Q6H PRN Bipin Rendon MD       • pantoprazole (PROTONIX) injection 40 mg  40 mg Intravenous Q AM Bipin Rendon MD   40 mg at 04/04/17 0616   • sodium chloride 0.9 % flush 1-10 mL  1-10 mL Intravenous PRN Bipin Rendon MD           Objective     Physical Exam:   Temp:  [97.8 °F (36.6 °C)-98.6 °F (37 °C)] 98.6 °F (37 °C)  Heart Rate:  [46-56] 46  Resp:  [18] 18  BP: (101-117)/(59-68) 104/68    Physical Exam:  General  Appearance:    Alert, cooperative, in no acute distress   Head:    Normocephalic, without obvious abnormality, atraumatic   Eyes:            Lids and lashes normal, conjunctivae and sclerae normal, no   icterus, no pallor, corneas clear, PERRLA   Ears:    Ears appear intact with no abnormalities noted   Throat:   No oral lesions, no thrush, oral mucosa moist   Neck:   No adenopathy, supple, trachea midline, no thyromegaly, no     carotid bruit, no JVD   Back:     No kyphosis present, no scoliosis present, no skin lesions,       erythema or scars, no tenderness to percussion or                   palpation,   range of motion normal   Lungs:     Clear to auscultation,respirations regular, even and                   unlabored    Heart:    Regular rhythm and normal rate, normal S1 and S2, no            murmur, no gallop, no rub, no click   Breast Exam:    Deferred   Abdomen:     Normal bowel sounds, no masses, no organomegaly, soft        non-tender, non-distended, no guarding, no rebound                 tenderness   Genitalia:    Deferred   Extremities:   Moves all extremities well, no edema, no cyanosis, no              redness   Pulses:   Pulses palpable and equal bilaterally   Skin:   No bleeding, bruising or rash   Lymph nodes:   No palpable adenopathy   Neurologic:   Cranial nerves 2 - 12 grossly intact, sensation intact, DTR        present and equal bilaterally      Results Review:     Lab Results   Component Value Date    WBC 6.79 04/04/2017    WBC 13.51 (H) 04/03/2017    HGB 13.7 04/04/2017    HGB 16.3 04/03/2017    HCT 39.8 04/04/2017    HCT 47.1 04/03/2017     04/04/2017     04/03/2017       Results from last 7 days  Lab Units 04/03/17  1300   ALK PHOS U/L 105   ALT (SGPT) U/L 54   AST (SGOT) U/L 36       Results from last 7 days  Lab Units 04/03/17  1300   BILIRUBIN mg/dL 0.4   ALK PHOS U/L 105     Lipase   Date Value Ref Range Status   04/03/2017 37 23 - 300 U/L Final     No results found for:  INR       Radiology Review:  Imaging Results (last 72 hours)     Procedure Component Value Units Date/Time    XR Abdomen 2 View With Chest 1 View [[id number]] Collected:  04/03/17 1330     Updated:  04/03/17 1334    Narrative:       Patient Name:  LANCE NOBLES  Patient ID:  [id number]   Ordering:  SUN CHU  Attending:  SUN CHU  Referring:  SUN CHU  ------------------------------------------------    Abdominal series with single view chest    HISTORY: Vomiting. Nausea.    Upright PA film of the chest and supine and upright abdominal  films obtained.  COMPARISON: None    Low lung volumes.  The lungs are clear of an acute process.  The heart is not enlarged.  The pulmonary vasculature is not increased.  No pleural effusion.  No pneumothorax.  No acute osseous abnormality.    No free air.  Gas and feces in the colon with some gaseous distention  transverse colon.  Air-fluid levels in the small and large bowel, compatible with an  adynamic ileus.  No organomegaly.  No abnormal calcifications.  Old compression deformities lumbar spine.  Minimal degenerative changes lumbar spine.  Surgical sutures right lower quadrant.      Impression:       CONCLUSION:  Adynamic ileus.  Low lung volumes.    13400    Electronically signed by:  Ross Cleveland MD  4/3/2017 1:33 PM CDT  Workstation: [email or web address]    CT Abdomen Pelvis With Contrast [[id number]] Collected:  04/03/17 1452     Updated:  04/03/17 1504    Narrative:       Patient Name:  LANCE NOBLES  Patient ID:  [id number]   Ordering:  SUN CHU  Attending:  SUN CHU  Referring:  SUN CHU  ------------------------------------------------  CT abdomen, pelvis with contrast.  CLINICAL INDICATION: Periumbilical abdominal pain.       COMPARISON: CT abdomen, pelvis February 11, 2010.       TECHNIQUE: Oral and nonionic IV contrast. 94 mL Isovue-300.  Helical scanning with axial and coronal reformations. Soft  tissue, lung, and bone windows  reviewed. This exam was performed  using radiation doses that are As Low As Reasonably Achievable  (ALARA).This exam was performed according to our departmental  dose optimization program, which includes automated exposure  control, adjustment of the mA and/or KV according to patient size  and/or use of iterative reconstruction technique.    ABDOMEN CT FINDINGS: The visualized lung bases show minor  dependent changes. Contrast is noted within the distal esophagus  suggesting gastroesophageal reflux. Liver, gallbladder, spleen,  pancreas, adrenal glands and kidneys are normal in appearance.     The colon is very dilated. The cecum 8.73 cm.   The colon is filled with fluid and stool. There is an area of  luminal narrowing and pericolonic inflammatory changes in the  sigmoid colon series 2 images 75-77. This may represent an  obstructing sigmoid neoplasm versus diverticulitis. Colonoscopy  should be considered for further evaluation.    No evidence of pathologically enlarged nodes, free air, or free  fluid. Degenerative changes of the spine.  The bones are grossly unremarkable.    PELVIS CT FINDINGS: No pelvic masses.    No evidence of  pathologically enlarged nodes, free air, or free fluid.     The bones are grossly unremarkable.      Impression:       CONCLUSION: Dilated colon filled with fluid and stool . Area of  luminal narrowing and pericolonic inflammatory changes sigmoid  colon. Differential considerations would include an obstructing  sigmoid neoplasm versus diverticulitis. CT abdomen, pelvis with  contrast is otherwise unremarkable.    Electronically signed by:  Kvng Rasmussen MD  4/3/2017 3:03 PM CDT  Workstation: [id number]    FL Barium Enema Water Soluble [[id number]] Collected:  04/03/17 1657     Updated:  04/03/17 1704    Narrative:       Patient Name:  LANCE NOBLES  Patient ID:  [id number]   Ordering:  VICTORIA ALCAZAR  Attending:  SUN CHU  Referring:  VICTORIA BOX  FIFE  ------------------------------------------------    Hypaque enema.    HISTORY: Colonic obstruction.    Preliminary film demonstrates contrast within the large and small  bowel from recent contrast CT exam.    1.24 minutes of fluoroscopy was used. Two bottles of Gastroview  was used. Seven images were obtained.    There is unobstructed flow of contrast from the rectum to the mid  descending colon. No discrete colonic obstruction is appreciated  on the current exam.    There is mucosal edema in the sigmoid colon corresponding to the  abnormality seen on CT exam.      Impression:       CONCLUSION:    No evidence of colonic obstruction. Mucosal edema and spasm in  the sigmoid colon at the site of abnormality seen on CT  examination. These findings therefore suggest a diagnosis of  diverticulitis as more likely than colonic neoplasm.    Electronically signed by:  Kvng Rasmussen MD  4/3/2017 5:03 PM CDT  Workstation: [id number]    XR Abdomen 2 View With Chest 1 View [[id number]] Collected:  04/04/17 0745     Updated:  04/04/17 0751    Narrative:       Patient Name:  LANCE NOBLES  Patient ID:  [id number]   Ordering:  CHRIS TAFOYA  Attending:  MANDI RODRIGUEZ  Referring:  CHRIS TAFOYA  ------------------------------------------------      Procedure: Acute abdomen series with CXR     Reason for exam: abd pain, K57.92 Diverticulitis of intestine,  part unspecified, without perforation or abscess without bleeding  E86.0 Dehydration E87.0 Hyperosmolality and hypernatremia    Findings: Comparison study dated April 3, 2017. Cardiac and  pulmonary vasculature is normal. Lungs are clear. Pleural spaces  are normal. No acute osseous abnormality.    Bony structures of abdomen reveal no acute abnormality. Soft  tissue structures normal. No pathologic calcification. Mild  diffuse colon fluid and air distention with mild caliber change  in the region of the distal descending colon sigmoid colon  region. No free  intraperitoneal air. Oral contrast from recent CT  is seen within the colon.      Impression:       Impression:  1.  Mild diffuse colon fluid and air distention with mild caliber  change in the descending colon sigmoid colon region consistent  with CT demonstrated inflammatory changes and/or neoplastic  involvement in this region.  2.  Otherwise unremarkable acute abdomen series.    Electronically signed by:  Sriram Yoon MD  4/4/2017 7:50 AM CDT  Workstation: [email or web address]           I reviewed the patient's new clinical results.  I reviewed the patient's new imaging results and agree with the interpretation.     ASSESSMENT/PLAN:   ASSESSMENT:   1.  Acute diverticulitis without bleeding, perforation with mild obstruction  2.  Acute exacerbation of obstipation secondary to use of oral laxatives.    PLAN:   1.  Continue intravenous antibiotics for a total of 72 hours  2.  Can advance the patient's diet as tolerated  3.  Needs a colonoscopy in 6-8 weeks to ensure that there is no evidence of any colon neoplasm  4.  If I can be of any further assistance to you, please do not hesitate to call.       Antwon Haile DO  04/04/17  9:25 PM

## 2017-04-06 VITALS
HEART RATE: 55 BPM | HEIGHT: 63 IN | WEIGHT: 169.8 LBS | RESPIRATION RATE: 18 BRPM | TEMPERATURE: 96.6 F | SYSTOLIC BLOOD PRESSURE: 115 MMHG | DIASTOLIC BLOOD PRESSURE: 68 MMHG | OXYGEN SATURATION: 96 % | BODY MASS INDEX: 30.09 KG/M2

## 2017-04-06 PROBLEM — E86.0 DEHYDRATION: Status: ACTIVE | Noted: 2017-04-06

## 2017-04-06 LAB
ANION GAP SERPL CALCULATED.3IONS-SCNC: 13 MMOL/L (ref 5–15)
BASOPHILS # BLD AUTO: 0.03 10*3/MM3 (ref 0–0.2)
BASOPHILS NFR BLD AUTO: 0.6 % (ref 0–2)
BUN BLD-MCNC: 10 MG/DL (ref 7–21)
BUN/CREAT SERPL: 13.3 (ref 7–25)
CALCIUM SPEC-SCNC: 8.7 MG/DL (ref 8.4–10.2)
CHLORIDE SERPL-SCNC: 102 MMOL/L (ref 95–110)
CO2 SERPL-SCNC: 26 MMOL/L (ref 22–31)
CREAT BLD-MCNC: 0.75 MG/DL (ref 0.7–1.3)
DEPRECATED RDW RBC AUTO: 38.3 FL (ref 35.1–43.9)
EOSINOPHIL # BLD AUTO: 0.17 10*3/MM3 (ref 0–0.7)
EOSINOPHIL NFR BLD AUTO: 3.3 % (ref 0–7)
ERYTHROCYTE [DISTWIDTH] IN BLOOD BY AUTOMATED COUNT: 12.6 % (ref 11.5–14.5)
GFR SERPL CREATININE-BSD FRML MDRD: 113 ML/MIN/1.73 (ref 63–147)
GFR SERPL CREATININE-BSD FRML MDRD: 137 ML/MIN/1.73 (ref 63–147)
GLUCOSE BLD-MCNC: 94 MG/DL (ref 60–100)
HCT VFR BLD AUTO: 39.2 % (ref 39–49)
HGB BLD-MCNC: 13.7 G/DL (ref 13.7–17.3)
IMM GRANULOCYTES # BLD: 0.01 10*3/MM3 (ref 0–0.02)
IMM GRANULOCYTES NFR BLD: 0.2 % (ref 0–0.5)
LYMPHOCYTES # BLD AUTO: 2.03 10*3/MM3 (ref 0.6–4.2)
LYMPHOCYTES NFR BLD AUTO: 39.3 % (ref 10–50)
MCH RBC QN AUTO: 28.8 PG (ref 26.5–34)
MCHC RBC AUTO-ENTMCNC: 34.9 G/DL (ref 31.5–36.3)
MCV RBC AUTO: 82.5 FL (ref 80–98)
MONOCYTES # BLD AUTO: 0.29 10*3/MM3 (ref 0–0.9)
MONOCYTES NFR BLD AUTO: 5.6 % (ref 0–12)
NEUTROPHILS # BLD AUTO: 2.63 10*3/MM3 (ref 2–8.6)
NEUTROPHILS NFR BLD AUTO: 51 % (ref 37–80)
PLATELET # BLD AUTO: 219 10*3/MM3 (ref 150–450)
PMV BLD AUTO: 11.2 FL (ref 8–12)
POTASSIUM BLD-SCNC: 3.6 MMOL/L (ref 3.5–5.1)
RBC # BLD AUTO: 4.75 10*6/MM3 (ref 4.37–5.74)
SODIUM BLD-SCNC: 141 MMOL/L (ref 137–145)
WBC NRBC COR # BLD: 5.16 10*3/MM3 (ref 3.2–9.8)

## 2017-04-06 PROCEDURE — 25010000002 LEVOFLOXACIN PER 250 MG: Performed by: PHYSICIAN ASSISTANT

## 2017-04-06 PROCEDURE — 80048 BASIC METABOLIC PNL TOTAL CA: CPT | Performed by: HOSPITALIST

## 2017-04-06 PROCEDURE — 99232 SBSQ HOSP IP/OBS MODERATE 35: CPT | Performed by: SURGERY

## 2017-04-06 PROCEDURE — 85025 COMPLETE CBC W/AUTO DIFF WBC: CPT | Performed by: HOSPITALIST

## 2017-04-06 RX ORDER — LEVOFLOXACIN 500 MG/1
500 TABLET, FILM COATED ORAL DAILY
Qty: 10 TABLET | Refills: 0 | Status: ON HOLD | OUTPATIENT
Start: 2017-04-06 | End: 2018-01-28

## 2017-04-06 RX ORDER — ONDANSETRON 4 MG/1
4 TABLET, ORALLY DISINTEGRATING ORAL EVERY 8 HOURS PRN
Qty: 10 TABLET | Refills: 0 | Status: ON HOLD | OUTPATIENT
Start: 2017-04-06 | End: 2018-09-07

## 2017-04-06 RX ORDER — METRONIDAZOLE 500 MG/1
500 TABLET ORAL 3 TIMES DAILY
Qty: 30 TABLET | Refills: 0 | Status: ON HOLD | OUTPATIENT
Start: 2017-04-06 | End: 2018-01-28

## 2017-04-06 RX ADMIN — PANTOPRAZOLE SODIUM 40 MG: 40 INJECTION, POWDER, FOR SOLUTION INTRAVENOUS at 06:13

## 2017-04-06 RX ADMIN — LEVOFLOXACIN 750 MG: 5 INJECTION, SOLUTION INTRAVENOUS at 12:51

## 2017-04-06 RX ADMIN — METRONIDAZOLE 500 MG: 500 INJECTION, SOLUTION INTRAVENOUS at 05:09

## 2017-04-06 RX ADMIN — DEXTROSE MONOHYDRATE 125 ML/HR: 50 INJECTION, SOLUTION INTRAVENOUS at 10:30

## 2017-04-06 RX ADMIN — METRONIDAZOLE 500 MG: 500 INJECTION, SOLUTION INTRAVENOUS at 12:00

## 2017-04-06 RX ADMIN — DEXTROSE MONOHYDRATE 125 ML/HR: 50 INJECTION, SOLUTION INTRAVENOUS at 00:51

## 2017-04-06 RX ADMIN — MAGNESIUM OXIDE TAB 400 MG (241.3 MG ELEMENTAL MG) 1000 MG: 400 (241.3 MG) TAB at 08:36

## 2017-04-06 NOTE — PROGRESS NOTES
LOS: 3 days   Patient Care Team:  No Known Provider as PCP - General    Chief Complaint:  <principal problem not specified>    Subjective     Interval History:   Feels better, no pain or abd distention, +bm    Objective     Vital Signs  Temp:  [96.4 °F (35.8 °C)-98.4 °F (36.9 °C)] 96.7 °F (35.9 °C)  Heart Rate:  [46-54] 50  Resp:  [18] 18  BP: (103-111)/(53-59) 107/58    Physical Exam:  No distention or tenderness     Results Review:       Lab Results (last 24 hours)     Procedure Component Value Units Date/Time    CBC & Differential [[id number]] Collected:  04/06/17 0502    Specimen:  Blood Updated:  04/06/17 0606    Narrative:       The following orders were created for panel order CBC & Differential.  Procedure                               Abnormality         Status                     ---------                               -----------         ------                     CBC Auto Differential[[id number]]         Normal              Final result                 Please view results for these tests on the individual orders.    CBC Auto Differential [[id number]]  (Normal) Collected:  04/06/17 0502    Specimen:  Blood Updated:  04/06/17 0606     WBC 5.16 10*3/mm3      RBC 4.75 10*6/mm3      Hemoglobin 13.7 g/dL      Hematocrit 39.2 %      MCV 82.5 fL      MCH 28.8 pg      MCHC 34.9 g/dL      RDW 12.6 %      RDW-SD 38.3 fl      MPV 11.2 fL      Platelets 219 10*3/mm3      Neutrophil % 51.0 %      Lymphocyte % 39.3 %      Monocyte % 5.6 %      Eosinophil % 3.3 %      Basophil % 0.6 %      Immature Grans % 0.2 %      Neutrophils, Absolute 2.63 10*3/mm3      Lymphocytes, Absolute 2.03 10*3/mm3      Monocytes, Absolute 0.29 10*3/mm3      Eosinophils, Absolute 0.17 10*3/mm3      Basophils, Absolute 0.03 10*3/mm3      Immature Grans, Absolute 0.01 10*3/mm3     Basic Metabolic Panel [[id number]]  (Normal) Collected:  04/06/17 0502    Specimen:  Blood Updated:  04/06/17 0633     Glucose 94 mg/dL      BUN 10 mg/dL      Creatinine  0.75 mg/dL      Sodium 141 mmol/L      Potassium 3.6 mmol/L      Chloride 102 mmol/L      CO2 26.0 mmol/L      Calcium 8.7 mg/dL      eGFR  African Amer 137 mL/min/1.73      eGFR Non African Amer 113 mL/min/1.73      BUN/Creatinine Ratio 13.3     Anion Gap 13.0 mmol/L           Medication Review:   Current Facility-Administered Medications   Medication Dose Route Frequency Provider Last Rate Last Dose   • dextrose (D5W) 5 % infusion  125 mL/hr Intravenous Continuous Juliette Lacy  mL/hr at 04/06/17 0051 125 mL/hr at 04/06/17 0051   • levoFLOXacin (LEVAQUIN) 750 mg/150 mL D5W (premix) (LEVAQUIN) 750 mg  750 mg Intravenous Q24H YONI Heredia   750 mg at 04/05/17 1438   • magnesium oxide (MAGOX) half tablet 1,000 mg  1,000 mg Oral Daily YONI Heredia   1,000 mg at 04/06/17 0836   • metroNIDAZOLE (FLAGYL) IVPB 500 mg  500 mg Intravenous Q8H YONI Heredia   500 mg at 04/06/17 0509   • ondansetron (ZOFRAN) injection 4 mg  4 mg Intravenous Q6H PRN Bipin Rendon MD       • pantoprazole (PROTONIX) injection 40 mg  40 mg Intravenous Q AM Bipin Rendon MD   40 mg at 04/06/17 0613   • sodium chloride 0.9 % flush 1-10 mL  1-10 mL Intravenous PRN Bipin Rendon MD           Assessment/Plan     Active Problems:    Acute diverticulitis    47 yo man HD#3 with diverticulitis with resolved large bowel obstruction  Regular diet  Can change antibiotics to oral  Patient is OK for discharge once tolerating reg diet and oral abx  F/u with me and Dr Haile, will need colonoscopy in 6-8 weeks       Bereket Velasquez MD  04/06/17  8:43 AM

## 2017-04-06 NOTE — DISCHARGE SUMMARY
[facility] Medicine Services  DISCHARGE SUMMARY       Date of Admission: 4/3/2017  Date of Discharge:  4/6/2017  Primary Care Physician: No Known Provider    Presenting Problem/History of Present Illness:  Acute hypernatremia [E87.0]  Severe dehydration [E86.0]  Acute diverticulitis [K57.92]       Final Discharge Diagnoses:  Active Problems:    Acute diverticulitis    Dehydration      Consults:   Consults     Date and Time Order Name Status Description    4/3/2017 1558 Inpatient Consult to Gastroenterology Completed     4/3/2017 1522 Hospitalist (on-call MD unless specified)      4/3/2017 1522 Surgery (on-call MD unless specified) Completed           HPI/Hospital Course:  The patient is a 46 y.o. male who presented to [facility] with abdominal pain. He has no significant past medical history. He states that approximately 4 days prior to admission he began to have a little bit of abdominal pain that increased daily until the day of admission. He states that on the day of admission his abdominal pain was pretty severe. He denies fever nausea or vomiting. He did have some diarrhea on the day of admission, but he thinks that is related to medicine that he took to try to alleviate the abdominal pain. At the time of my exam he was more comfortable. He wanted food, and no when he can go home. There are no alleviating or exacerbating factors.  CT of the abdomen revealed Dilated colon filled with fluid and stool . Area of luminal narrowing and pericolonic inflammatory changes sigmoidcolon. Differential considerations would include an obstructing sigmoid neoplasm versus diverticulitis.  The patient was admitted and evaluated by general surgery and gastroenterology.  He was started on IV fluid and IV antibiotics.  The patient underwent barium enema which revealed No evidence of colonic obstruction. Mucosal edema and spasm inthe sigmoid colon at the site of abnormality  "seen on CT examination. These findings therefore suggest a diagnosis of diverticulitis as more likely than colonic neoplasm.  He improved with IV antibiotics. He will complete antibiotic course with PO levaquin and flagyl. Diet was advanced.  He is tolerated solid diet and is cleared for discharge by surgery.  He was recommended 6-8 week follow-up with Dr. Velasquez and Dr. Haile.         Condition on Discharge:  Stable    Physical Exam on Discharge:  /68 (BP Location: Right arm, Patient Position: Lying)  Pulse 55  Temp 96.6 °F (35.9 °C)  Resp 18  Ht 63\" (160 cm)  Wt 169 lb 12.8 oz (77 kg)  SpO2 96%  BMI 30.08 kg/m2  Physical Exam   Constitutional: He is oriented to person, place, and time. He appears well-developed and well-nourished. No distress.   HENT:   Head: Normocephalic and atraumatic.   Mouth/Throat: Oropharynx is clear and moist.   Eyes: Conjunctivae and EOM are normal.   Neck: Normal range of motion. Neck supple.   Cardiovascular: Normal rate, regular rhythm and intact distal pulses.  Exam reveals no gallop and no friction rub.    No murmur heard.  Pulmonary/Chest: Effort normal and breath sounds normal. No respiratory distress. He has no wheezes. He has no rales. He exhibits no tenderness.   Abdominal: Soft. Bowel sounds are normal. He exhibits no distension. There is no tenderness.   Musculoskeletal: Normal range of motion. He exhibits no edema or deformity.   Neurological: He is alert and oriented to person, place, and time.   Skin: Skin is warm and dry. He is not diaphoretic. No erythema.   Psychiatric: He has a normal mood and affect. His behavior is normal. Judgment and thought content normal.   Vitals reviewed.    Discharge Disposition:  Home or Self Care    Discharge Medications:   Rancho Mehta   Home Medication Instructions RAZIA:[id number]    Printed on:04/06/17 1436   Medication Information                      levoFLOXacin (LEVAQUIN) 500 MG tablet  Take 1 tablet by mouth Daily.     "         magnesium hydroxide (MILK OF MAGNESIA) 2400 MG/10ML suspension suspension  Take 10 mL by mouth Daily As Needed.             metroNIDAZOLE (FLAGYL) 500 MG tablet  Take 1 tablet by mouth 3 (Three) Times a Day.             ondansetron ODT (ZOFRAN ODT) 4 MG disintegrating tablet  Take 1 tablet by mouth Every 8 (Eight) Hours As Needed for Nausea or Vomiting.                 Discharge Diet:   Diet Instructions     Advance Diet As Tolerated           Diet: Regular; Thin Liquids, No Restrictions       Discharge Diet:  Regular   Fluid Consistency:  Thin Liquids, No Restrictions   Low fiber                 Activity at Discharge:   Activity Instructions     Activity as Tolerated                     Follow-up Appointments:   Future Appointments  Date Time Provider Department Center   4/11/2017 9:15 AM Antwon Alonso MD [facility] None   5/18/2017 9:00 AM Bereket Velasquez MD [facility] None         CARLOS Sagastume  04/06/17  2:31 PM    Time: Discharge planning and teaching took greater than 30 minutes.

## 2017-04-07 NOTE — NURSING NOTE
"Called patient's phone number on file and informed them that we found some glasses in the room after the patient was discharged.  They stated \"those are not ours\".  Placed the glasses in the clinical leader drawer, incase someone shows up to claim them.  The glasses are black frames, with sliver daisies on the side with black centers.  The inside of the frames have while daisies.    "

## 2017-04-11 ENCOUNTER — OFFICE VISIT (OUTPATIENT)
Dept: FAMILY MEDICINE CLINIC | Facility: CLINIC | Age: 47
End: 2017-04-11

## 2017-04-11 VITALS
OXYGEN SATURATION: 98 % | HEART RATE: 56 BPM | BODY MASS INDEX: 29.86 KG/M2 | TEMPERATURE: 97.3 F | HEIGHT: 63 IN | WEIGHT: 168.5 LBS | SYSTOLIC BLOOD PRESSURE: 100 MMHG | DIASTOLIC BLOOD PRESSURE: 60 MMHG

## 2017-04-11 DIAGNOSIS — K57.92 ACUTE DIVERTICULITIS: Primary | ICD-10-CM

## 2017-04-11 PROCEDURE — 99201 PR OFFICE OUTPATIENT NEW 10 MINUTES: CPT | Performed by: FAMILY MEDICINE

## 2017-04-11 NOTE — PROGRESS NOTES
Subjective   Rancho Mehta is a 46 y.o. male.     History of Present Illness     Recent hospitalization acute hypernatremia, severe dehydration, acute diverticulitis.     He says he is fine.  No abdominal pain or problems.  His abdomen was hurting all over prilor to hospitalizations.  He has no difficulty breathing or wheezing.  Pmh:  diverticulitis  Psh: none   Sh:  Use to smoke cigarettes but quit years ago  Fh:  Mom  of asthma    Review of Systems   Unable to perform ROS: Other       Objective   Physical Exam   Constitutional: He is oriented to person, place, and time. He appears well-developed and well-nourished.   HENT:   Head: Normocephalic and atraumatic.   Right Ear: External ear normal.   Left Ear: External ear normal.   Nose: Nose normal.   Mouth/Throat: Oropharynx is clear and moist.   Eyes: Conjunctivae and EOM are normal. Pupils are equal, round, and reactive to light.   Neck: Normal range of motion. Neck supple.   Cardiovascular: Normal rate, regular rhythm and normal heart sounds.  Exam reveals no gallop and no friction rub.    No murmur heard.  Pulmonary/Chest: Effort normal and breath sounds normal.   Abdominal: Soft. Bowel sounds are normal. He exhibits no distension. There is no tenderness. There is no rebound and no guarding.   Musculoskeletal: Normal range of motion. He exhibits no edema or deformity.   Neurological: He is alert and oriented to person, place, and time. No cranial nerve deficit.   Skin: Skin is warm and dry. No rash noted. No erythema.   Psychiatric: He has a normal mood and affect. His behavior is normal. Judgment and thought content normal.   Nursing note and vitals reviewed.      Assessment/Plan   Rancho was seen today for follow-up.    Diagnoses and all orders for this visit:    Acute diverticulitis      Resolved.  Return if any problems.

## 2018-01-25 ENCOUNTER — HOSPITAL ENCOUNTER (OUTPATIENT)
Facility: HOSPITAL | Age: 48
Setting detail: OBSERVATION
Discharge: HOME | End: 2018-01-28
Attending: EMERGENCY MEDICINE | Admitting: FAMILY MEDICINE

## 2018-01-25 ENCOUNTER — APPOINTMENT (OUTPATIENT)
Dept: CT IMAGING | Facility: HOSPITAL | Age: 48
End: 2018-01-25

## 2018-01-25 DIAGNOSIS — R10.32 LEFT LOWER QUADRANT PAIN: Primary | ICD-10-CM

## 2018-01-25 LAB
ALBUMIN SERPL-MCNC: 4.6 G/DL (ref 3.4–4.8)
ALBUMIN/GLOB SERPL: 1.3 G/DL (ref 1.1–1.8)
ALP SERPL-CCNC: 104 U/L (ref 38–126)
ALT SERPL W P-5'-P-CCNC: 54 U/L (ref 21–72)
ANION GAP SERPL CALCULATED.3IONS-SCNC: 13 MMOL/L (ref 5–15)
AST SERPL-CCNC: 37 U/L (ref 17–59)
BASOPHILS # BLD AUTO: 0.01 10*3/MM3 (ref 0–0.2)
BASOPHILS NFR BLD AUTO: 0.1 % (ref 0–2)
BILIRUB SERPL-MCNC: 0.8 MG/DL (ref 0.2–1.3)
BILIRUB UR QL STRIP: ABNORMAL
BUN BLD-MCNC: 14 MG/DL (ref 7–21)
BUN/CREAT SERPL: 21.2 (ref 7–25)
CALCIUM SPEC-SCNC: 9.1 MG/DL (ref 8.4–10.2)
CHLORIDE SERPL-SCNC: 96 MMOL/L (ref 95–110)
CLARITY UR: CLEAR
CO2 SERPL-SCNC: 28 MMOL/L (ref 22–31)
COLOR UR: YELLOW
CREAT BLD-MCNC: 0.66 MG/DL (ref 0.7–1.3)
DEPRECATED RDW RBC AUTO: 40.3 FL (ref 35.1–43.9)
EOSINOPHIL # BLD AUTO: 0 10*3/MM3 (ref 0–0.7)
EOSINOPHIL NFR BLD AUTO: 0 % (ref 0–7)
ERYTHROCYTE [DISTWIDTH] IN BLOOD BY AUTOMATED COUNT: 13.4 % (ref 11.5–14.5)
GFR SERPL CREATININE-BSD FRML MDRD: 131 ML/MIN/1.73 (ref 63–147)
GFR SERPL CREATININE-BSD FRML MDRD: 158 ML/MIN/1.73 (ref 63–147)
GLOBULIN UR ELPH-MCNC: 3.5 GM/DL (ref 2.3–3.5)
GLUCOSE BLD-MCNC: 130 MG/DL (ref 60–100)
GLUCOSE UR STRIP-MCNC: NEGATIVE MG/DL
HCT VFR BLD AUTO: 41.5 % (ref 39–49)
HGB BLD-MCNC: 14.7 G/DL (ref 13.7–17.3)
HGB UR QL STRIP.AUTO: NEGATIVE
IMM GRANULOCYTES # BLD: 0.02 10*3/MM3 (ref 0–0.02)
IMM GRANULOCYTES NFR BLD: 0.2 % (ref 0–0.5)
KETONES UR QL STRIP: ABNORMAL
LEUKOCYTE ESTERASE UR QL STRIP.AUTO: NEGATIVE
LIPASE SERPL-CCNC: 33 U/L (ref 23–300)
LYMPHOCYTES # BLD AUTO: 0.91 10*3/MM3 (ref 0.6–4.2)
LYMPHOCYTES NFR BLD AUTO: 7.8 % (ref 10–50)
MCH RBC QN AUTO: 29.3 PG (ref 26.5–34)
MCHC RBC AUTO-ENTMCNC: 35.4 G/DL (ref 31.5–36.3)
MCV RBC AUTO: 82.7 FL (ref 80–98)
MONOCYTES # BLD AUTO: 0.59 10*3/MM3 (ref 0–0.9)
MONOCYTES NFR BLD AUTO: 5 % (ref 0–12)
NEUTROPHILS # BLD AUTO: 10.2 10*3/MM3 (ref 2–8.6)
NEUTROPHILS NFR BLD AUTO: 86.9 % (ref 37–80)
NITRITE UR QL STRIP: NEGATIVE
PH UR STRIP.AUTO: 6 [PH] (ref 5–9)
PLATELET # BLD AUTO: 223 10*3/MM3 (ref 150–450)
PMV BLD AUTO: 11 FL (ref 8–12)
POTASSIUM BLD-SCNC: 3.3 MMOL/L (ref 3.5–5.1)
PROT SERPL-MCNC: 8.1 G/DL (ref 6.3–8.6)
PROT UR QL STRIP: ABNORMAL
RBC # BLD AUTO: 5.02 10*6/MM3 (ref 4.37–5.74)
SODIUM BLD-SCNC: 137 MMOL/L (ref 137–145)
SP GR UR STRIP: 1.02 (ref 1–1.03)
UROBILINOGEN UR QL STRIP: ABNORMAL
WBC NRBC COR # BLD: 11.73 10*3/MM3 (ref 3.2–9.8)

## 2018-01-25 PROCEDURE — 96361 HYDRATE IV INFUSION ADD-ON: CPT

## 2018-01-25 PROCEDURE — 74177 CT ABD & PELVIS W/CONTRAST: CPT

## 2018-01-25 PROCEDURE — 25010000002 LEVOFLOXACIN PER 250 MG: Performed by: EMERGENCY MEDICINE

## 2018-01-25 PROCEDURE — 25010000002 MORPHINE PER 10 MG: Performed by: FAMILY MEDICINE

## 2018-01-25 PROCEDURE — G0378 HOSPITAL OBSERVATION PER HR: HCPCS

## 2018-01-25 PROCEDURE — 99284 EMERGENCY DEPT VISIT MOD MDM: CPT

## 2018-01-25 PROCEDURE — 96365 THER/PROPH/DIAG IV INF INIT: CPT

## 2018-01-25 PROCEDURE — 25010000002 ONDANSETRON PER 1 MG: Performed by: EMERGENCY MEDICINE

## 2018-01-25 PROCEDURE — 96375 TX/PRO/DX INJ NEW DRUG ADDON: CPT

## 2018-01-25 PROCEDURE — 96367 TX/PROPH/DG ADDL SEQ IV INF: CPT

## 2018-01-25 PROCEDURE — 80053 COMPREHEN METABOLIC PANEL: CPT | Performed by: EMERGENCY MEDICINE

## 2018-01-25 PROCEDURE — 81003 URINALYSIS AUTO W/O SCOPE: CPT | Performed by: EMERGENCY MEDICINE

## 2018-01-25 PROCEDURE — 96376 TX/PRO/DX INJ SAME DRUG ADON: CPT

## 2018-01-25 PROCEDURE — 0 DIATRIZOATE MEGLUMINE & SODIUM PER 1 ML: Performed by: EMERGENCY MEDICINE

## 2018-01-25 PROCEDURE — 85025 COMPLETE CBC W/AUTO DIFF WBC: CPT | Performed by: EMERGENCY MEDICINE

## 2018-01-25 PROCEDURE — 83690 ASSAY OF LIPASE: CPT | Performed by: EMERGENCY MEDICINE

## 2018-01-25 PROCEDURE — 0 IOPAMIDOL 61 % SOLUTION: Performed by: EMERGENCY MEDICINE

## 2018-01-25 PROCEDURE — 25010000002 MORPHINE PER 10 MG: Performed by: EMERGENCY MEDICINE

## 2018-01-25 RX ORDER — LEVOFLOXACIN 5 MG/ML
750 INJECTION, SOLUTION INTRAVENOUS ONCE
Status: COMPLETED | OUTPATIENT
Start: 2018-01-25 | End: 2018-01-26

## 2018-01-25 RX ORDER — MAGNESIUM SULFATE HEPTAHYDRATE 40 MG/ML
4 INJECTION, SOLUTION INTRAVENOUS AS NEEDED
Status: DISCONTINUED | OUTPATIENT
Start: 2018-01-25 | End: 2018-01-28 | Stop reason: HOSPADM

## 2018-01-25 RX ORDER — LEVOFLOXACIN 5 MG/ML
500 INJECTION, SOLUTION INTRAVENOUS EVERY 24 HOURS
Status: DISCONTINUED | OUTPATIENT
Start: 2018-01-26 | End: 2018-01-28 | Stop reason: HOSPADM

## 2018-01-25 RX ORDER — SODIUM CHLORIDE 0.9 % (FLUSH) 0.9 %
10 SYRINGE (ML) INJECTION AS NEEDED
Status: DISCONTINUED | OUTPATIENT
Start: 2018-01-25 | End: 2018-01-28 | Stop reason: HOSPADM

## 2018-01-25 RX ORDER — POTASSIUM CHLORIDE 1.5 G/1.77G
40 POWDER, FOR SOLUTION ORAL AS NEEDED
Status: DISCONTINUED | OUTPATIENT
Start: 2018-01-25 | End: 2018-01-28 | Stop reason: HOSPADM

## 2018-01-25 RX ORDER — MAGNESIUM SULFATE HEPTAHYDRATE 40 MG/ML
2 INJECTION, SOLUTION INTRAVENOUS AS NEEDED
Status: DISCONTINUED | OUTPATIENT
Start: 2018-01-25 | End: 2018-01-28 | Stop reason: HOSPADM

## 2018-01-25 RX ORDER — NALOXONE HCL 0.4 MG/ML
0.4 VIAL (ML) INJECTION
Status: DISCONTINUED | OUTPATIENT
Start: 2018-01-25 | End: 2018-01-28 | Stop reason: HOSPADM

## 2018-01-25 RX ORDER — SODIUM CHLORIDE 9 MG/ML
100 INJECTION, SOLUTION INTRAVENOUS CONTINUOUS
Status: DISCONTINUED | OUTPATIENT
Start: 2018-01-26 | End: 2018-01-28 | Stop reason: HOSPADM

## 2018-01-25 RX ORDER — MORPHINE SULFATE 2 MG/ML
1 INJECTION, SOLUTION INTRAMUSCULAR; INTRAVENOUS EVERY 4 HOURS PRN
Status: DISCONTINUED | OUTPATIENT
Start: 2018-01-25 | End: 2018-01-28 | Stop reason: HOSPADM

## 2018-01-25 RX ORDER — POTASSIUM CHLORIDE 7.45 MG/ML
10 INJECTION INTRAVENOUS
Status: DISCONTINUED | OUTPATIENT
Start: 2018-01-25 | End: 2018-01-28 | Stop reason: HOSPADM

## 2018-01-25 RX ORDER — ONDANSETRON 2 MG/ML
4 INJECTION INTRAMUSCULAR; INTRAVENOUS EVERY 4 HOURS
Status: DISCONTINUED | OUTPATIENT
Start: 2018-01-26 | End: 2018-01-26

## 2018-01-25 RX ORDER — SODIUM CHLORIDE 0.9 % (FLUSH) 0.9 %
1-10 SYRINGE (ML) INJECTION AS NEEDED
Status: DISCONTINUED | OUTPATIENT
Start: 2018-01-25 | End: 2018-01-28 | Stop reason: HOSPADM

## 2018-01-25 RX ORDER — ACETAMINOPHEN 325 MG/1
650 TABLET ORAL EVERY 4 HOURS PRN
Status: DISCONTINUED | OUTPATIENT
Start: 2018-01-25 | End: 2018-01-28 | Stop reason: HOSPADM

## 2018-01-25 RX ORDER — PANTOPRAZOLE SODIUM 40 MG/10ML
40 INJECTION, POWDER, LYOPHILIZED, FOR SOLUTION INTRAVENOUS EVERY 12 HOURS SCHEDULED
Status: DISCONTINUED | OUTPATIENT
Start: 2018-01-26 | End: 2018-01-28 | Stop reason: HOSPADM

## 2018-01-25 RX ORDER — ONDANSETRON 2 MG/ML
4 INJECTION INTRAMUSCULAR; INTRAVENOUS ONCE
Status: COMPLETED | OUTPATIENT
Start: 2018-01-25 | End: 2018-01-25

## 2018-01-25 RX ORDER — POTASSIUM CHLORIDE 750 MG/1
40 CAPSULE, EXTENDED RELEASE ORAL AS NEEDED
Status: DISCONTINUED | OUTPATIENT
Start: 2018-01-25 | End: 2018-01-28 | Stop reason: HOSPADM

## 2018-01-25 RX ADMIN — ONDANSETRON 4 MG: 2 INJECTION INTRAMUSCULAR; INTRAVENOUS at 19:53

## 2018-01-25 RX ADMIN — IOPAMIDOL 100 ML: 612 INJECTION, SOLUTION INTRAVENOUS at 21:48

## 2018-01-25 RX ADMIN — SODIUM CHLORIDE 1000 ML: 9 INJECTION, SOLUTION INTRAVENOUS at 19:54

## 2018-01-25 RX ADMIN — METRONIDAZOLE 500 MG: 500 SOLUTION INTRAVENOUS at 22:51

## 2018-01-25 RX ADMIN — MORPHINE SULFATE 1 MG: 2 INJECTION, SOLUTION INTRAMUSCULAR; INTRAVENOUS at 23:59

## 2018-01-25 RX ADMIN — MORPHINE SULFATE 4 MG: 4 INJECTION, SOLUTION INTRAMUSCULAR; INTRAVENOUS at 22:50

## 2018-01-25 RX ADMIN — MORPHINE SULFATE 4 MG: 4 INJECTION, SOLUTION INTRAMUSCULAR; INTRAVENOUS at 19:54

## 2018-01-25 RX ADMIN — DIATRIZOATE MEGLUMINE AND DIATRIZOATE SODIUM 120 ML: 660; 100 LIQUID ORAL; RECTAL at 21:49

## 2018-01-25 RX ADMIN — LEVOFLOXACIN 750 MG: 5 INJECTION, SOLUTION INTRAVENOUS at 23:14

## 2018-01-26 LAB
ANION GAP SERPL CALCULATED.3IONS-SCNC: 12 MMOL/L (ref 5–15)
BASOPHILS # BLD AUTO: 0.01 10*3/MM3 (ref 0–0.2)
BASOPHILS NFR BLD AUTO: 0.1 % (ref 0–2)
BUN BLD-MCNC: 13 MG/DL (ref 7–21)
BUN/CREAT SERPL: 21.3 (ref 7–25)
CALCIUM SPEC-SCNC: 8.3 MG/DL (ref 8.4–10.2)
CHLORIDE SERPL-SCNC: 101 MMOL/L (ref 95–110)
CO2 SERPL-SCNC: 27 MMOL/L (ref 22–31)
CREAT BLD-MCNC: 0.61 MG/DL (ref 0.7–1.3)
DEPRECATED RDW RBC AUTO: 41.1 FL (ref 35.1–43.9)
EOSINOPHIL # BLD AUTO: 0.03 10*3/MM3 (ref 0–0.7)
EOSINOPHIL NFR BLD AUTO: 0.4 % (ref 0–7)
ERYTHROCYTE [DISTWIDTH] IN BLOOD BY AUTOMATED COUNT: 13.5 % (ref 11.5–14.5)
GFR SERPL CREATININE-BSD FRML MDRD: 143 ML/MIN/1.73 (ref 60–147)
GFR SERPL CREATININE-BSD FRML MDRD: 173 ML/MIN/1.73 (ref 63–147)
GLUCOSE BLD-MCNC: 102 MG/DL (ref 60–100)
HCT VFR BLD AUTO: 37.8 % (ref 39–49)
HGB BLD-MCNC: 12.9 G/DL (ref 13.7–17.3)
IMM GRANULOCYTES # BLD: 0.01 10*3/MM3 (ref 0–0.02)
IMM GRANULOCYTES NFR BLD: 0.1 % (ref 0–0.5)
LYMPHOCYTES # BLD AUTO: 1.46 10*3/MM3 (ref 0.6–4.2)
LYMPHOCYTES NFR BLD AUTO: 18.1 % (ref 10–50)
MCH RBC QN AUTO: 28.6 PG (ref 26.5–34)
MCHC RBC AUTO-ENTMCNC: 34.1 G/DL (ref 31.5–36.3)
MCV RBC AUTO: 83.8 FL (ref 80–98)
MONOCYTES # BLD AUTO: 0.5 10*3/MM3 (ref 0–0.9)
MONOCYTES NFR BLD AUTO: 6.2 % (ref 0–12)
NEUTROPHILS # BLD AUTO: 6.05 10*3/MM3 (ref 2–8.6)
NEUTROPHILS NFR BLD AUTO: 75.1 % (ref 37–80)
PLATELET # BLD AUTO: 204 10*3/MM3 (ref 150–450)
PMV BLD AUTO: 10.8 FL (ref 8–12)
POTASSIUM BLD-SCNC: 3.9 MMOL/L (ref 3.5–5.1)
POTASSIUM BLD-SCNC: 4.2 MMOL/L (ref 3.5–5.1)
RBC # BLD AUTO: 4.51 10*6/MM3 (ref 4.37–5.74)
SODIUM BLD-SCNC: 140 MMOL/L (ref 137–145)
WBC NRBC COR # BLD: 8.06 10*3/MM3 (ref 3.2–9.8)

## 2018-01-26 PROCEDURE — G0378 HOSPITAL OBSERVATION PER HR: HCPCS

## 2018-01-26 PROCEDURE — 96367 TX/PROPH/DG ADDL SEQ IV INF: CPT

## 2018-01-26 PROCEDURE — 96375 TX/PRO/DX INJ NEW DRUG ADDON: CPT

## 2018-01-26 PROCEDURE — 80048 BASIC METABOLIC PNL TOTAL CA: CPT | Performed by: FAMILY MEDICINE

## 2018-01-26 PROCEDURE — 96361 HYDRATE IV INFUSION ADD-ON: CPT

## 2018-01-26 PROCEDURE — 84132 ASSAY OF SERUM POTASSIUM: CPT | Performed by: FAMILY MEDICINE

## 2018-01-26 PROCEDURE — 96376 TX/PRO/DX INJ SAME DRUG ADON: CPT

## 2018-01-26 PROCEDURE — 25010000002 ONDANSETRON PER 1 MG: Performed by: FAMILY MEDICINE

## 2018-01-26 PROCEDURE — 25010000002 LEVOFLOXACIN PER 250 MG: Performed by: FAMILY MEDICINE

## 2018-01-26 PROCEDURE — 25010000002 POTASSIUM CHLORIDE PER 2 MEQ OF POTASSIUM: Performed by: FAMILY MEDICINE

## 2018-01-26 PROCEDURE — 25010000002 MORPHINE PER 10 MG: Performed by: FAMILY MEDICINE

## 2018-01-26 PROCEDURE — 96366 THER/PROPH/DIAG IV INF ADDON: CPT

## 2018-01-26 PROCEDURE — 85025 COMPLETE CBC W/AUTO DIFF WBC: CPT | Performed by: FAMILY MEDICINE

## 2018-01-26 RX ORDER — ONDANSETRON 2 MG/ML
4 INJECTION INTRAMUSCULAR; INTRAVENOUS EVERY 4 HOURS PRN
Status: DISCONTINUED | OUTPATIENT
Start: 2018-01-26 | End: 2018-01-28 | Stop reason: HOSPADM

## 2018-01-26 RX ADMIN — PANTOPRAZOLE SODIUM 40 MG: 40 INJECTION, POWDER, FOR SOLUTION INTRAVENOUS at 09:14

## 2018-01-26 RX ADMIN — METRONIDAZOLE 500 MG: 500 INJECTION, SOLUTION INTRAVENOUS at 06:36

## 2018-01-26 RX ADMIN — MORPHINE SULFATE 1 MG: 2 INJECTION, SOLUTION INTRAMUSCULAR; INTRAVENOUS at 05:05

## 2018-01-26 RX ADMIN — ONDANSETRON 4 MG: 2 INJECTION INTRAMUSCULAR; INTRAVENOUS at 00:51

## 2018-01-26 RX ADMIN — ONDANSETRON 4 MG: 2 INJECTION INTRAMUSCULAR; INTRAVENOUS at 12:58

## 2018-01-26 RX ADMIN — PANTOPRAZOLE SODIUM 40 MG: 40 INJECTION, POWDER, FOR SOLUTION INTRAVENOUS at 00:51

## 2018-01-26 RX ADMIN — PANTOPRAZOLE SODIUM 40 MG: 40 INJECTION, POWDER, FOR SOLUTION INTRAVENOUS at 21:12

## 2018-01-26 RX ADMIN — METRONIDAZOLE 500 MG: 500 INJECTION, SOLUTION INTRAVENOUS at 14:36

## 2018-01-26 RX ADMIN — POTASSIUM CHLORIDE: 149 INJECTION, SOLUTION, CONCENTRATE INTRAVENOUS at 02:21

## 2018-01-26 RX ADMIN — SODIUM CHLORIDE 100 ML/HR: 900 INJECTION, SOLUTION INTRAVENOUS at 01:07

## 2018-01-26 RX ADMIN — METRONIDAZOLE 500 MG: 500 INJECTION, SOLUTION INTRAVENOUS at 22:43

## 2018-01-26 RX ADMIN — ONDANSETRON 4 MG: 2 INJECTION INTRAMUSCULAR; INTRAVENOUS at 05:00

## 2018-01-26 RX ADMIN — MORPHINE SULFATE 1 MG: 2 INJECTION, SOLUTION INTRAMUSCULAR; INTRAVENOUS at 22:55

## 2018-01-26 RX ADMIN — SODIUM CHLORIDE 100 ML/HR: 900 INJECTION, SOLUTION INTRAVENOUS at 18:02

## 2018-01-26 RX ADMIN — LEVOFLOXACIN 500 MG: 5 INJECTION, SOLUTION INTRAVENOUS at 21:12

## 2018-01-26 RX ADMIN — ONDANSETRON 4 MG: 2 INJECTION INTRAMUSCULAR; INTRAVENOUS at 09:14

## 2018-01-27 PROBLEM — K52.9 COLITIS: Status: ACTIVE | Noted: 2018-01-27

## 2018-01-27 LAB
ANION GAP SERPL CALCULATED.3IONS-SCNC: 9 MMOL/L (ref 5–15)
BASOPHILS # BLD AUTO: 0.01 10*3/MM3 (ref 0–0.2)
BASOPHILS NFR BLD AUTO: 0.2 % (ref 0–2)
BUN BLD-MCNC: 13 MG/DL (ref 7–21)
BUN/CREAT SERPL: 18.1 (ref 7–25)
CALCIUM SPEC-SCNC: 8.4 MG/DL (ref 8.4–10.2)
CHLORIDE SERPL-SCNC: 107 MMOL/L (ref 95–110)
CO2 SERPL-SCNC: 25 MMOL/L (ref 22–31)
CREAT BLD-MCNC: 0.72 MG/DL (ref 0.7–1.3)
DEPRECATED RDW RBC AUTO: 41.9 FL (ref 35.1–43.9)
EOSINOPHIL # BLD AUTO: 0.08 10*3/MM3 (ref 0–0.7)
EOSINOPHIL NFR BLD AUTO: 1.3 % (ref 0–7)
ERYTHROCYTE [DISTWIDTH] IN BLOOD BY AUTOMATED COUNT: 13.6 % (ref 11.5–14.5)
GFR SERPL CREATININE-BSD FRML MDRD: 118 ML/MIN/1.73 (ref 63–147)
GFR SERPL CREATININE-BSD FRML MDRD: 143 ML/MIN/1.73 (ref 63–147)
GLUCOSE BLD-MCNC: 89 MG/DL (ref 60–100)
HCT VFR BLD AUTO: 36.2 % (ref 39–49)
HGB BLD-MCNC: 12.3 G/DL (ref 13.7–17.3)
IMM GRANULOCYTES # BLD: 0.01 10*3/MM3 (ref 0–0.02)
IMM GRANULOCYTES NFR BLD: 0.2 % (ref 0–0.5)
LYMPHOCYTES # BLD AUTO: 1.39 10*3/MM3 (ref 0.6–4.2)
LYMPHOCYTES NFR BLD AUTO: 23 % (ref 10–50)
MCH RBC QN AUTO: 28.9 PG (ref 26.5–34)
MCHC RBC AUTO-ENTMCNC: 34 G/DL (ref 31.5–36.3)
MCV RBC AUTO: 85 FL (ref 80–98)
MONOCYTES # BLD AUTO: 0.32 10*3/MM3 (ref 0–0.9)
MONOCYTES NFR BLD AUTO: 5.3 % (ref 0–12)
NEUTROPHILS # BLD AUTO: 4.24 10*3/MM3 (ref 2–8.6)
NEUTROPHILS NFR BLD AUTO: 70 % (ref 37–80)
PLATELET # BLD AUTO: 229 10*3/MM3 (ref 150–450)
PMV BLD AUTO: 10.7 FL (ref 8–12)
POTASSIUM BLD-SCNC: 3.6 MMOL/L (ref 3.5–5.1)
RBC # BLD AUTO: 4.26 10*6/MM3 (ref 4.37–5.74)
SODIUM BLD-SCNC: 141 MMOL/L (ref 137–145)
WBC NRBC COR # BLD: 6.05 10*3/MM3 (ref 3.2–9.8)

## 2018-01-27 PROCEDURE — 96361 HYDRATE IV INFUSION ADD-ON: CPT

## 2018-01-27 PROCEDURE — 80048 BASIC METABOLIC PNL TOTAL CA: CPT | Performed by: FAMILY MEDICINE

## 2018-01-27 PROCEDURE — 85025 COMPLETE CBC W/AUTO DIFF WBC: CPT | Performed by: FAMILY MEDICINE

## 2018-01-27 PROCEDURE — 96376 TX/PRO/DX INJ SAME DRUG ADON: CPT

## 2018-01-27 PROCEDURE — 25010000002 LEVOFLOXACIN PER 250 MG: Performed by: FAMILY MEDICINE

## 2018-01-27 PROCEDURE — G0378 HOSPITAL OBSERVATION PER HR: HCPCS

## 2018-01-27 RX ADMIN — PANTOPRAZOLE SODIUM 40 MG: 40 INJECTION, POWDER, FOR SOLUTION INTRAVENOUS at 21:35

## 2018-01-27 RX ADMIN — METRONIDAZOLE 500 MG: 500 INJECTION, SOLUTION INTRAVENOUS at 15:23

## 2018-01-27 RX ADMIN — LEVOFLOXACIN 500 MG: 5 INJECTION, SOLUTION INTRAVENOUS at 21:35

## 2018-01-27 RX ADMIN — POTASSIUM CHLORIDE 40 MEQ: 750 CAPSULE, EXTENDED RELEASE ORAL at 09:29

## 2018-01-27 RX ADMIN — METRONIDAZOLE 500 MG: 500 INJECTION, SOLUTION INTRAVENOUS at 06:19

## 2018-01-27 RX ADMIN — SODIUM CHLORIDE 100 ML/HR: 900 INJECTION, SOLUTION INTRAVENOUS at 15:24

## 2018-01-27 RX ADMIN — PANTOPRAZOLE SODIUM 40 MG: 40 INJECTION, POWDER, FOR SOLUTION INTRAVENOUS at 09:29

## 2018-01-27 RX ADMIN — SODIUM CHLORIDE 100 ML/HR: 900 INJECTION, SOLUTION INTRAVENOUS at 06:19

## 2018-01-27 RX ADMIN — METRONIDAZOLE 500 MG: 500 INJECTION, SOLUTION INTRAVENOUS at 23:58

## 2018-01-28 VITALS
SYSTOLIC BLOOD PRESSURE: 120 MMHG | HEIGHT: 63 IN | WEIGHT: 167.4 LBS | OXYGEN SATURATION: 94 % | BODY MASS INDEX: 29.66 KG/M2 | HEART RATE: 55 BPM | TEMPERATURE: 98.4 F | RESPIRATION RATE: 16 BRPM | DIASTOLIC BLOOD PRESSURE: 81 MMHG

## 2018-01-28 LAB
ANION GAP SERPL CALCULATED.3IONS-SCNC: 8 MMOL/L (ref 5–15)
BASOPHILS # BLD AUTO: 0.03 10*3/MM3 (ref 0–0.2)
BASOPHILS NFR BLD AUTO: 0.6 % (ref 0–2)
BUN BLD-MCNC: 7 MG/DL (ref 7–21)
BUN/CREAT SERPL: 10.6 (ref 7–25)
CALCIUM SPEC-SCNC: 8.3 MG/DL (ref 8.4–10.2)
CHLORIDE SERPL-SCNC: 106 MMOL/L (ref 95–110)
CO2 SERPL-SCNC: 26 MMOL/L (ref 22–31)
CREAT BLD-MCNC: 0.66 MG/DL (ref 0.7–1.3)
DEPRECATED RDW RBC AUTO: 40.6 FL (ref 35.1–43.9)
EOSINOPHIL # BLD AUTO: 0.22 10*3/MM3 (ref 0–0.7)
EOSINOPHIL NFR BLD AUTO: 4.5 % (ref 0–7)
ERYTHROCYTE [DISTWIDTH] IN BLOOD BY AUTOMATED COUNT: 13.4 % (ref 11.5–14.5)
GFR SERPL CREATININE-BSD FRML MDRD: 131 ML/MIN/1.73 (ref 60–147)
GFR SERPL CREATININE-BSD FRML MDRD: 158 ML/MIN/1.73 (ref 63–147)
GLUCOSE BLD-MCNC: 83 MG/DL (ref 60–100)
HCT VFR BLD AUTO: 38.2 % (ref 39–49)
HGB BLD-MCNC: 12.9 G/DL (ref 13.7–17.3)
IMM GRANULOCYTES # BLD: 0.01 10*3/MM3 (ref 0–0.02)
IMM GRANULOCYTES NFR BLD: 0.2 % (ref 0–0.5)
LYMPHOCYTES # BLD AUTO: 1.67 10*3/MM3 (ref 0.6–4.2)
LYMPHOCYTES NFR BLD AUTO: 33.9 % (ref 10–50)
MCH RBC QN AUTO: 28.2 PG (ref 26.5–34)
MCHC RBC AUTO-ENTMCNC: 33.8 G/DL (ref 31.5–36.3)
MCV RBC AUTO: 83.6 FL (ref 80–98)
MONOCYTES # BLD AUTO: 0.28 10*3/MM3 (ref 0–0.9)
MONOCYTES NFR BLD AUTO: 5.7 % (ref 0–12)
NEUTROPHILS # BLD AUTO: 2.71 10*3/MM3 (ref 2–8.6)
NEUTROPHILS NFR BLD AUTO: 55.1 % (ref 37–80)
PLATELET # BLD AUTO: 222 10*3/MM3 (ref 150–450)
PMV BLD AUTO: 10.9 FL (ref 8–12)
POTASSIUM BLD-SCNC: 3.7 MMOL/L (ref 3.5–5.1)
RBC # BLD AUTO: 4.57 10*6/MM3 (ref 4.37–5.74)
SODIUM BLD-SCNC: 140 MMOL/L (ref 137–145)
WBC NRBC COR # BLD: 4.92 10*3/MM3 (ref 3.2–9.8)

## 2018-01-28 PROCEDURE — 96361 HYDRATE IV INFUSION ADD-ON: CPT

## 2018-01-28 PROCEDURE — 96376 TX/PRO/DX INJ SAME DRUG ADON: CPT

## 2018-01-28 PROCEDURE — 80048 BASIC METABOLIC PNL TOTAL CA: CPT | Performed by: FAMILY MEDICINE

## 2018-01-28 PROCEDURE — G0378 HOSPITAL OBSERVATION PER HR: HCPCS

## 2018-01-28 PROCEDURE — 25010000002 INFLUENZA VAC SPLIT QUAD SUSPENSION: Performed by: FAMILY MEDICINE

## 2018-01-28 PROCEDURE — G0008 ADMIN INFLUENZA VIRUS VAC: HCPCS | Performed by: FAMILY MEDICINE

## 2018-01-28 PROCEDURE — 85025 COMPLETE CBC W/AUTO DIFF WBC: CPT | Performed by: FAMILY MEDICINE

## 2018-01-28 PROCEDURE — 90682 RIV4 VACC RECOMBINANT DNA IM: CPT | Performed by: FAMILY MEDICINE

## 2018-01-28 RX ORDER — METRONIDAZOLE 500 MG/1
500 TABLET ORAL 3 TIMES DAILY
Qty: 42 TABLET | Refills: 0 | Status: SHIPPED | OUTPATIENT
Start: 2018-01-28 | End: 2018-02-11

## 2018-01-28 RX ORDER — LEVOFLOXACIN 500 MG/1
500 TABLET, FILM COATED ORAL DAILY
Qty: 14 TABLET | Refills: 0 | Status: SHIPPED | OUTPATIENT
Start: 2018-01-28 | End: 2018-02-11

## 2018-01-28 RX ADMIN — INFLUENZA A VIRUS A/MICHIGAN/45/2015 X-275 (H1N1) ANTIGEN (FORMALDEHYDE INACTIVATED), INFLUENZA A VIRUS A/HONG KONG/4801/2014 X-263B (H3N2) ANTIGEN (FORMALDEHYDE INACTIVATED), INFLUENZA B VIRUS B/PHUKET/3073/2013 ANTIGEN (FORMALDEHYDE INACTIVATED), AND INFLUENZA B VIRUS B/BRISBANE/60/2008 ANTIGEN (FORMALDEHYDE INACTIVATED) 0.5 ML: 15; 15; 15; 15 INJECTION, SUSPENSION INTRAMUSCULAR at 15:45

## 2018-01-28 RX ADMIN — METRONIDAZOLE 500 MG: 500 INJECTION, SOLUTION INTRAVENOUS at 06:08

## 2018-01-28 RX ADMIN — SODIUM CHLORIDE 100 ML/HR: 900 INJECTION, SOLUTION INTRAVENOUS at 06:08

## 2018-01-28 RX ADMIN — PANTOPRAZOLE SODIUM 40 MG: 40 INJECTION, POWDER, FOR SOLUTION INTRAVENOUS at 09:39

## 2018-02-09 ENCOUNTER — HOSPITAL ENCOUNTER (OUTPATIENT)
Facility: HOSPITAL | Age: 48
Setting detail: HOSPITAL OUTPATIENT SURGERY
End: 2018-02-09
Attending: INTERNAL MEDICINE | Admitting: INTERNAL MEDICINE

## 2018-09-07 ENCOUNTER — OFFICE VISIT (OUTPATIENT)
Dept: SURGERY | Facility: CLINIC | Age: 48
End: 2018-09-07

## 2018-09-07 ENCOUNTER — ANESTHESIA EVENT (OUTPATIENT)
Dept: PERIOP | Facility: HOSPITAL | Age: 48
End: 2018-09-07

## 2018-09-07 ENCOUNTER — HOSPITAL ENCOUNTER (OUTPATIENT)
Facility: HOSPITAL | Age: 48
Setting detail: HOSPITAL OUTPATIENT SURGERY
Discharge: HOME | End: 2018-09-07
Attending: SURGERY | Admitting: SURGERY

## 2018-09-07 ENCOUNTER — ANESTHESIA (OUTPATIENT)
Dept: PERIOP | Facility: HOSPITAL | Age: 48
End: 2018-09-07

## 2018-09-07 VITALS
HEART RATE: 63 BPM | WEIGHT: 160.2 LBS | SYSTOLIC BLOOD PRESSURE: 128 MMHG | TEMPERATURE: 97.9 F | HEIGHT: 63 IN | DIASTOLIC BLOOD PRESSURE: 74 MMHG | BODY MASS INDEX: 28.39 KG/M2

## 2018-09-07 VITALS
HEART RATE: 80 BPM | TEMPERATURE: 98 F | DIASTOLIC BLOOD PRESSURE: 82 MMHG | RESPIRATION RATE: 20 BRPM | OXYGEN SATURATION: 96 % | SYSTOLIC BLOOD PRESSURE: 120 MMHG

## 2018-09-07 DIAGNOSIS — K61.0 PERIANAL ABSCESS: ICD-10-CM

## 2018-09-07 DIAGNOSIS — K61.0 PERIANAL ABSCESS: Primary | ICD-10-CM

## 2018-09-07 PROCEDURE — 25010000002 MIDAZOLAM PER 1 MG: Performed by: NURSE ANESTHETIST, CERTIFIED REGISTERED

## 2018-09-07 PROCEDURE — 87186 SC STD MICRODIL/AGAR DIL: CPT | Performed by: SURGERY

## 2018-09-07 PROCEDURE — 87205 SMEAR GRAM STAIN: CPT | Performed by: SURGERY

## 2018-09-07 PROCEDURE — 87070 CULTURE OTHR SPECIMN AEROBIC: CPT | Performed by: SURGERY

## 2018-09-07 PROCEDURE — 25010000002 CEFOXITIN: Performed by: SURGERY

## 2018-09-07 PROCEDURE — 99203 OFFICE O/P NEW LOW 30 MIN: CPT | Performed by: SURGERY

## 2018-09-07 PROCEDURE — 25010000002 FENTANYL CITRATE (PF) 100 MCG/2ML SOLUTION: Performed by: NURSE ANESTHETIST, CERTIFIED REGISTERED

## 2018-09-07 PROCEDURE — 87077 CULTURE AEROBIC IDENTIFY: CPT | Performed by: SURGERY

## 2018-09-07 PROCEDURE — 46050 I&D PERIANAL ABSCESS SUPFC: CPT | Performed by: SURGERY

## 2018-09-07 PROCEDURE — 25010000002 PROPOFOL 10 MG/ML EMULSION: Performed by: NURSE ANESTHETIST, CERTIFIED REGISTERED

## 2018-09-07 PROCEDURE — 25010000002 ONDANSETRON PER 1 MG: Performed by: NURSE ANESTHETIST, CERTIFIED REGISTERED

## 2018-09-07 RX ORDER — BUPIVACAINE HYDROCHLORIDE AND EPINEPHRINE 5; 5 MG/ML; UG/ML
INJECTION, SOLUTION EPIDURAL; INTRACAUDAL; PERINEURAL AS NEEDED
Status: DISCONTINUED | OUTPATIENT
Start: 2018-09-07 | End: 2018-09-07 | Stop reason: HOSPADM

## 2018-09-07 RX ORDER — MIDAZOLAM HYDROCHLORIDE 1 MG/ML
INJECTION INTRAMUSCULAR; INTRAVENOUS AS NEEDED
Status: DISCONTINUED | OUTPATIENT
Start: 2018-09-07 | End: 2018-09-07 | Stop reason: SURG

## 2018-09-07 RX ORDER — GLYCOPYRROLATE 0.2 MG/ML
INJECTION INTRAMUSCULAR; INTRAVENOUS AS NEEDED
Status: DISCONTINUED | OUTPATIENT
Start: 2018-09-07 | End: 2018-09-07 | Stop reason: SURG

## 2018-09-07 RX ORDER — ONDANSETRON 2 MG/ML
4 INJECTION INTRAMUSCULAR; INTRAVENOUS ONCE AS NEEDED
Status: DISCONTINUED | OUTPATIENT
Start: 2018-09-07 | End: 2018-09-07 | Stop reason: HOSPADM

## 2018-09-07 RX ORDER — ACETAMINOPHEN 500 MG
500 TABLET ORAL EVERY 6 HOURS PRN
COMMUNITY
End: 2018-09-07 | Stop reason: HOSPADM

## 2018-09-07 RX ORDER — FENTANYL CITRATE 50 UG/ML
INJECTION, SOLUTION INTRAMUSCULAR; INTRAVENOUS AS NEEDED
Status: DISCONTINUED | OUTPATIENT
Start: 2018-09-07 | End: 2018-09-07 | Stop reason: SURG

## 2018-09-07 RX ORDER — SODIUM CHLORIDE 9 MG/ML
100 INJECTION, SOLUTION INTRAVENOUS CONTINUOUS
Status: CANCELLED | OUTPATIENT
Start: 2018-09-07

## 2018-09-07 RX ORDER — SODIUM CHLORIDE 9 MG/ML
100 INJECTION, SOLUTION INTRAVENOUS CONTINUOUS
Status: DISCONTINUED | OUTPATIENT
Start: 2018-09-07 | End: 2018-09-07 | Stop reason: HOSPADM

## 2018-09-07 RX ORDER — HYDROCODONE BITARTRATE AND ACETAMINOPHEN 5; 325 MG/1; MG/1
1-2 TABLET ORAL EVERY 4 HOURS PRN
Qty: 30 TABLET | Refills: 0 | Status: SHIPPED | OUTPATIENT
Start: 2018-09-07 | End: 2019-04-15

## 2018-09-07 RX ORDER — LIDOCAINE HYDROCHLORIDE 10 MG/ML
INJECTION, SOLUTION INFILTRATION; PERINEURAL AS NEEDED
Status: DISCONTINUED | OUTPATIENT
Start: 2018-09-07 | End: 2018-09-07 | Stop reason: SURG

## 2018-09-07 RX ORDER — ONDANSETRON 2 MG/ML
INJECTION INTRAMUSCULAR; INTRAVENOUS AS NEEDED
Status: DISCONTINUED | OUTPATIENT
Start: 2018-09-07 | End: 2018-09-07 | Stop reason: SURG

## 2018-09-07 RX ORDER — PROPOFOL 10 MG/ML
VIAL (ML) INTRAVENOUS AS NEEDED
Status: DISCONTINUED | OUTPATIENT
Start: 2018-09-07 | End: 2018-09-07 | Stop reason: SURG

## 2018-09-07 RX ADMIN — FENTANYL CITRATE 25 MCG: 50 INJECTION, SOLUTION INTRAMUSCULAR; INTRAVENOUS at 17:40

## 2018-09-07 RX ADMIN — SODIUM CHLORIDE 100 ML/HR: 9 INJECTION, SOLUTION INTRAVENOUS at 17:09

## 2018-09-07 RX ADMIN — LIDOCAINE HYDROCHLORIDE 50 MG: 10 INJECTION, SOLUTION INFILTRATION; PERINEURAL at 17:20

## 2018-09-07 RX ADMIN — FENTANYL CITRATE 25 MCG: 50 INJECTION, SOLUTION INTRAMUSCULAR; INTRAVENOUS at 17:35

## 2018-09-07 RX ADMIN — PROPOFOL 200 MG: 10 INJECTION, EMULSION INTRAVENOUS at 17:20

## 2018-09-07 RX ADMIN — FENTANYL CITRATE 25 MCG: 50 INJECTION, SOLUTION INTRAMUSCULAR; INTRAVENOUS at 17:24

## 2018-09-07 RX ADMIN — ONDANSETRON 4 MG: 2 INJECTION INTRAMUSCULAR; INTRAVENOUS at 17:40

## 2018-09-07 RX ADMIN — GLYCOPYRROLATE 0.2 MG: 0.2 INJECTION, SOLUTION INTRAMUSCULAR; INTRAVENOUS at 17:20

## 2018-09-07 RX ADMIN — MIDAZOLAM 2 MG: 1 INJECTION INTRAMUSCULAR; INTRAVENOUS at 17:19

## 2018-09-07 RX ADMIN — CEFOXITIN 2 G: 2 INJECTION, POWDER, FOR SOLUTION INTRAVENOUS at 17:25

## 2018-09-07 RX ADMIN — FENTANYL CITRATE 25 MCG: 50 INJECTION, SOLUTION INTRAMUSCULAR; INTRAVENOUS at 17:30

## 2018-09-07 NOTE — ADDENDUM NOTE
Addendum  created 09/07/18 1802 by Tone Alfaro MD    Anesthesia Attestations filed, Anesthesia Staff edited

## 2018-09-07 NOTE — ANESTHESIA PREPROCEDURE EVALUATION
Anesthesia Evaluation     Patient summary reviewed   NPO Solid Status: > 8 hours  NPO Liquid Status: > 8 hours           Airway   Mallampati: II  TM distance: <3 FB  Neck ROM: full  Possible difficult intubation and Anterior  Dental    (+) poor dentition    Pulmonary     breath sounds clear to auscultation  (+) a smoker Former, COPD mild,   Cardiovascular   Exercise tolerance: good (4-7 METS)    Rhythm: regular  Rate: abnormal        Neuro/Psych  (+) psychiatric history Anxiety and Depression,     GI/Hepatic/Renal/Endo    (+)  GERD poorly controlled,      Musculoskeletal     Abdominal     Abdomen: soft.   Substance History      OB/GYN          Other                        Anesthesia Plan    ASA 2 - emergent     general   Rapid sequence(Through the Thai interpretor, the risks and benefits of the GA were explained to the patient.  Consent obtained.  Plan for the rsi with cricoid pressure for the perirectal abscess procedure.)  intravenous induction   Anesthetic plan, all risks, benefits, and alternatives have been provided, discussed and informed consent has been obtained with: patient and spouse/significant other.

## 2018-09-07 NOTE — PROGRESS NOTES
CHIEF COMPLAINT:    Perianal pain    HISTORY OF PRESENT ILLNESS:    Rancho Mehta is a 48 y.o. male who was referred from urgent care for a 4-5 day history of perianal swelling and pain.  He notes no fevers or chills during this time.  The pain is constant, sharp and worsening. He notes no drainage.  He has never had this problem before.    No past medical history on file.    Past Surgical History:   Procedure Laterality Date   • APPENDECTOMY         Prior to Admission medications    Medication Sig Start Date End Date Taking? Authorizing Provider   magnesium hydroxide (MILK OF MAGNESIA) 2400 MG/10ML suspension suspension Take 10 mL by mouth Daily As Needed.   Yes Provider, MD Jennifer   ondansetron ODT (ZOFRAN ODT) 4 MG disintegrating tablet Take 1 tablet by mouth Every 8 (Eight) Hours As Needed for Nausea or Vomiting. 4/6/17   Dru Morillo, CARLOS       No Known Allergies    No family history on file.    Social History     Social History   • Marital status: Significant Other     Spouse name: N/A   • Number of children: N/A   • Years of education: N/A     Occupational History   • Not on file.     Social History Main Topics   • Smoking status: Never Smoker   • Smokeless tobacco: Never Used   • Alcohol use No   • Drug use: No   • Sexual activity: Not on file     Other Topics Concern   • Not on file     Social History Narrative   • No narrative on file       Review of Systems   Constitutional: Negative for activity change, appetite change, chills, diaphoresis and fever.   HENT: Negative for hearing loss, nosebleeds and trouble swallowing.    Cardiovascular: Negative for chest pain, palpitations and leg swelling.   Gastrointestinal: Negative for abdominal distention, abdominal pain, anal bleeding, blood in stool, constipation, diarrhea, nausea, rectal pain and vomiting.        Perianal pain   Endocrine: Negative for cold intolerance, heat intolerance, polydipsia and polyuria.   Genitourinary: Negative for  "decreased urine volume, difficulty urinating, dysuria, enuresis, frequency, hematuria and urgency.   Musculoskeletal: Negative for arthralgias, back pain, gait problem, myalgias and neck pain.   Skin: Negative for pallor, rash and wound.   Allergic/Immunologic: Negative for immunocompromised state.   Neurological: Negative for dizziness, seizures, weakness, light-headedness, numbness and headaches.   Psychiatric/Behavioral: Negative for agitation and behavioral problems. The patient is not nervous/anxious.        Objective     /74   Pulse 63   Temp 97.9 °F (36.6 °C) (Temporal Artery )   Ht 160 cm (63\")   Wt 72.7 kg (160 lb 3.2 oz)   BMI 28.38 kg/m²     Physical Exam   Constitutional: He is oriented to person, place, and time. He appears well-developed and well-nourished.   HENT:   Head: Normocephalic and atraumatic.   Nose: Nose normal.   Eyes: Conjunctivae and EOM are normal. Right eye exhibits no discharge. Left eye exhibits no discharge.   Neck: Trachea normal, normal range of motion and phonation normal. Neck supple. No JVD present. No tracheal deviation and no edema present. No thyromegaly present.   Cardiovascular: Normal rate, regular rhythm and normal heart sounds.  Exam reveals no gallop and no friction rub.    No murmur heard.  Pulmonary/Chest: Effort normal and breath sounds normal. No accessory muscle usage. No respiratory distress. He has no decreased breath sounds. He has no wheezes. He has no rales. He exhibits no tenderness.   Abdominal: Soft. He exhibits no distension, no fluid wave, no ascites, no pulsatile midline mass and no mass. There is no tenderness. There is no rebound and no guarding. No hernia.   Genitourinary:         Musculoskeletal: Normal range of motion. He exhibits no edema, tenderness or deformity.   Lymphadenopathy:     He has no cervical adenopathy.        Left: No supraclavicular adenopathy present.   Neurological: He is alert and oriented to person, place, and time. " He has normal strength. No cranial nerve deficit.   Skin: Skin is warm and dry. No rash noted. He is not diaphoretic. No erythema. No pallor.   Psychiatric: He has a normal mood and affect. His speech is normal and behavior is normal. Judgment and thought content normal. Cognition and memory are normal.   Vitals reviewed.        ASSESSMENT:    Perianal Abscess    PLAN:    He will need urgent drainage of his perianal abscess.  I discussed this with him and he understands that I have recommended incision and drainage of this abscess.  He is agreeable to having this done today.  The risks and benefits of incision and drainage of his abscess have been discussed with him.          This document has been electronically signed by Porfirio Alfaro MD on September 7, 2018 4:03 PM

## 2018-09-07 NOTE — BRIEF OP NOTE
INCISION AND DRAINAGE ABSCESS  Progress Note    Rancho Mehta  9/7/2018    Pre-op Diagnosis:   Perianal abscess [K61.0]       Post-Op Diagnosis Codes:     * Perianal abscess [K61.0]    Procedure/CPT® Codes:      Procedure(s):  INCISION AND DRAINAGE PERIANAL ABSCESS Candy cane keo    Surgeon(s):  Porfirio Alfaro MD    Anesthesia: General    Staff:   Circulator: Berna Ozuna RN  Scrub Person: Martha Sue    Estimated Blood Loss: minimal    Urine Voided: * No values recorded between 9/7/2018  5:14 PM and 9/7/2018  5:50 PM *    Specimens:                ID Type Source Tests Collected by Time   1 : drainage from perianal abscess Wound Anus WOUND CULTURE Porfirio Alfaro MD 9/7/2018 1744         Drains:      Findings: perianal abscess    Complications: none      Porfirio Alfaro MD     Date: 9/7/2018  Time: 5:54 PM

## 2018-09-07 NOTE — INTERVAL H&P NOTE
H&P reviewed. The patient was examined and there are no changes to the H&P.           This document has been electronically signed by Porfirio Alfaro MD on September 7, 2018 5:53 PM

## 2018-09-07 NOTE — ANESTHESIA POSTPROCEDURE EVALUATION
Patient: Rancho Mehta    Procedure Summary     Date:  09/07/18 Room / Location:  [facility] OR [address] [place] [address] OR    Anesthesia Start:  1720 Anesthesia Stop:  1753    Procedure:  INCISION AND DRAINAGE PERIANAL ABSCESS Candy cane stirrups (N/A ) Diagnosis:       Perianal abscess      (Perianal abscess [K61.0])    Surgeon:  Porfirio Alfaro MD Provider:  Rudolph Loving CRNA    Anesthesia Type:  general ASA Status:  2 - Emergent          Anesthesia Type: general  Last vitals  BP   139/69 (09/07/18 1656)   Temp   97.6 °F (36.4 °C) (09/07/18 1656)   Pulse   (!) 48 (09/07/18 1656)   Resp         SpO2   96 % (09/07/18 1656)     Post Anesthesia Care and Evaluation    Patient location during evaluation: PACU  Level of consciousness: responsive to physical stimuli  Pain score: 0  Pain management: adequate  Airway patency: patent  Anesthetic complications: No anesthetic complications  PONV Status: none  Cardiovascular status: acceptable and hemodynamically stable  Respiratory status: acceptable and spontaneous ventilation  Hydration status: acceptable

## 2018-09-07 NOTE — DISCHARGE INSTR - APPOINTMENTS
Please call Dr. Alfaro's office on Monday to schedule an appointment for Friday Sept. 14th.[phone]

## 2018-09-07 NOTE — ANESTHESIA PROCEDURE NOTES
Airway  Urgency: elective    Airway not difficult    General Information and Staff    Patient location during procedure: OR  CRNA: ABDI GARCIA    Indications and Patient Condition  Indications for airway management: airway protection    Preoxygenated: yes  Mask difficulty assessment: 0 - not attempted    Final Airway Details  Final airway type: supraglottic airway      Successful airway: Supraglottic airway: teleflex lma.  Size 4    Number of attempts at approach: 1

## 2018-09-10 LAB
BACTERIA SPEC AEROBE CULT: ABNORMAL
GRAM STN SPEC: ABNORMAL

## 2018-09-11 NOTE — OP NOTE
Operative Note    Rancho Mehta  9/7/2018    Pre-op Diagnosis:   Perianal abscess [K61.0]    Post-op Diagnosis:     Post-Op Diagnosis Codes:     * Perianal abscess [K61.0]    Procedure/CPT® Codes:      Procedure(s):  INCISION AND DRAINAGE PERIANAL ABSCESS Candy cane keo    Surgeon(s):  Porfirio Alfaro MD    Anesthesia: General    Staff:   Circulator: Berna Ozuna, RN  Scrub Person: Vikram Martha K    Estimated Blood Loss: minimal    Specimens:                ID Type Source Tests Collected by Time   1 : drainage from perianal abscess Wound Anus WOUND CULTURE Porfirio Alfaro MD 9/7/2018 1744         Drains:      Findings: Perianal abscess    Complications: None    Indication: Perianal abscess    Operative Note:    The patient was seen and consented preoperatively.  Following this he is brought to the operating room and placed in supine position on the OR table.  Gen. anesthetic was administered and a laryngeal mask airway was placed.  He was then positioned in lithotomy position using candycane stirrups.  A preoperative briefing was performed.  The area was prepped and draped in normal sterile fashion.  A timeout was then performed.    An external examination revealed a left-sided perianal area of swelling consistent with abscess.  Digital rectal examination was performed and the abscess did not appear to extend above the dentate line.  Local anesthetic was then injected overlying the abscess which was then incised with immediate return of purulent material portion which was collected for Gram stain and culture.  The abscess cavity was then irrigated copiously.  Packing was then placed.  Sterile dressings were then placed.  The patient was then returned to a neutral position and awakened without issue.        This document has been electronically signed by Porfirio Alfaro MD on September 11, 2018 5:24 PM        Porfirio Alfaro MD     Date: 9/11/2018  Time: 5:22 PM

## 2019-03-26 ENCOUNTER — OFFICE VISIT (OUTPATIENT)
Dept: SURGERY | Facility: CLINIC | Age: 49
End: 2019-03-26

## 2019-03-26 VITALS
HEART RATE: 76 BPM | WEIGHT: 166 LBS | DIASTOLIC BLOOD PRESSURE: 76 MMHG | SYSTOLIC BLOOD PRESSURE: 130 MMHG | HEIGHT: 63 IN | BODY MASS INDEX: 29.41 KG/M2 | TEMPERATURE: 98.1 F

## 2019-03-26 DIAGNOSIS — K60.3 FISTULA-IN-ANO: Primary | ICD-10-CM

## 2019-03-26 PROCEDURE — 99213 OFFICE O/P EST LOW 20 MIN: CPT | Performed by: SURGERY

## 2019-03-26 RX ORDER — SODIUM CHLORIDE 9 MG/ML
100 INJECTION, SOLUTION INTRAVENOUS CONTINUOUS
Status: CANCELLED | OUTPATIENT
Start: 2019-04-19

## 2019-04-09 NOTE — PROGRESS NOTES
CHIEF COMPLAINT:  Anal drainage and discomfort    HISTORY OF PRESENT ILLNESS:    Rancho Mehta is a 48 y.o. male who is known to me for prior incision and drainage of a perianal abscess in September of 2018.  He returns today because after the site healed he began having further intermittent drainage.  This has continued.  He notes mild discomfort in the area.  He denies fevers or chills.     Past Medical History:   Diagnosis Date   • Abdominal pain        Past Surgical History:   Procedure Laterality Date   • APPENDECTOMY     • INCISION AND DRAINAGE ABSCESS N/A 9/7/2018    Procedure: INCISION AND DRAINAGE PERIANAL ABSCESS Candy cane stirrups;  Surgeon: Porfirio Alfaro MD;  Location: [facility];  Service: General       Prior to Admission medications    Medication Sig Start Date End Date Taking? Authorizing Provider   HYDROcodone-acetaminophen (NORCO) 5-325 MG per tablet Take 1-2 tablets by mouth Every 4 (Four) Hours As Needed (Pain). 9/7/18   Porfirio Alfaro MD       No Known Allergies    History reviewed. No pertinent family history.    Social History     Socioeconomic History   • Marital status: Single     Spouse name: Not on file   • Number of children: Not on file   • Years of education: Not on file   • Highest education level: Not on file   Tobacco Use   • Smoking status: Never Smoker   • Smokeless tobacco: Never Used   Substance and Sexual Activity   • Alcohol use: Yes     Comment: occasional   • Drug use: No   • Sexual activity: Defer       Review of Systems   Constitutional: Negative for chills and fever.   HENT: Negative for trouble swallowing.    Respiratory: Negative for shortness of breath.    Cardiovascular: Negative for chest pain.   Gastrointestinal: Negative for abdominal distention, anal bleeding, blood in stool, constipation, diarrhea, nausea and vomiting.        Anal drainage, occasional swelling   Genitourinary: Negative for difficulty urinating.   Musculoskeletal: Negative for  "arthralgias and back pain.       Objective     /76   Pulse 76   Temp 98.1 °F (36.7 °C) (Oral)   Ht 160 cm (63\")   Wt 75.3 kg (166 lb)   BMI 29.41 kg/m²     Physical Exam   Constitutional: He is oriented to person, place, and time. He appears well-developed and well-nourished.   HENT:   Head: Normocephalic and atraumatic.   Nose: Nose normal.   Eyes: Conjunctivae and EOM are normal. Right eye exhibits no discharge. Left eye exhibits no discharge.   Neck: Trachea normal, normal range of motion and phonation normal. Neck supple. No JVD present. No tracheal deviation and no edema present. No thyromegaly present.   Cardiovascular: Normal rate, regular rhythm and normal heart sounds. Exam reveals no gallop and no friction rub.   No murmur heard.  Pulmonary/Chest: Effort normal and breath sounds normal. No accessory muscle usage. No respiratory distress. He has no decreased breath sounds. He has no wheezes. He has no rales. He exhibits no tenderness.   Abdominal: Soft. He exhibits no distension, no fluid wave, no ascites, no pulsatile midline mass and no mass. There is no tenderness. There is no rebound and no guarding. No hernia.   Genitourinary:         Musculoskeletal: Normal range of motion. He exhibits no edema, tenderness or deformity.   Lymphadenopathy:     He has no cervical adenopathy.        Left: No supraclavicular adenopathy present.   Neurological: He is alert and oriented to person, place, and time. He has normal strength. No cranial nerve deficit.   Skin: Skin is warm and dry. No rash noted. He is not diaphoretic. No erythema. No pallor.   Psychiatric: He has a normal mood and affect. His speech is normal and behavior is normal. Judgment and thought content normal. Cognition and memory are normal.   Vitals reviewed.        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  This diagnosis was discussed with him today.  I recommended he " undergo examination under anesthesia and fistulotomy vs. Seton placement.  I offered to do this in the next few days.  He would like to work some and save money prior to this.  The risks and benefits of RECTAL EXAM UNDER ANESTHESIA, possible Seton placement, possible fistulotomy have been discussed and he is agreeable with proceeding.  Will plan for OR 4/8/19 per his request.            This document has been electronically signed by Porfirio Alfaro MD on April 9, 2019 4:19 PM

## 2019-04-09 NOTE — H&P (VIEW-ONLY)
CHIEF COMPLAINT:  Anal drainage and discomfort    HISTORY OF PRESENT ILLNESS:    Rancho Mehta is a 48 y.o. male who is known to me for prior incision and drainage of a perianal abscess in September of 2018.  He returns today because after the site healed he began having further intermittent drainage.  This has continued.  He notes mild discomfort in the area.  He denies fevers or chills.     Past Medical History:   Diagnosis Date   • Abdominal pain        Past Surgical History:   Procedure Laterality Date   • APPENDECTOMY     • INCISION AND DRAINAGE ABSCESS N/A 9/7/2018    Procedure: INCISION AND DRAINAGE PERIANAL ABSCESS Candy cane stirrups;  Surgeon: Porfirio Alfaro MD;  Location: [facility];  Service: General       Prior to Admission medications    Medication Sig Start Date End Date Taking? Authorizing Provider   HYDROcodone-acetaminophen (NORCO) 5-325 MG per tablet Take 1-2 tablets by mouth Every 4 (Four) Hours As Needed (Pain). 9/7/18   Porfirio Alfaro MD       No Known Allergies    History reviewed. No pertinent family history.    Social History     Socioeconomic History   • Marital status: Single     Spouse name: Not on file   • Number of children: Not on file   • Years of education: Not on file   • Highest education level: Not on file   Tobacco Use   • Smoking status: Never Smoker   • Smokeless tobacco: Never Used   Substance and Sexual Activity   • Alcohol use: Yes     Comment: occasional   • Drug use: No   • Sexual activity: Defer       Review of Systems   Constitutional: Negative for chills and fever.   HENT: Negative for trouble swallowing.    Respiratory: Negative for shortness of breath.    Cardiovascular: Negative for chest pain.   Gastrointestinal: Negative for abdominal distention, anal bleeding, blood in stool, constipation, diarrhea, nausea and vomiting.        Anal drainage, occasional swelling   Genitourinary: Negative for difficulty urinating.   Musculoskeletal: Negative for  "arthralgias and back pain.       Objective     /76   Pulse 76   Temp 98.1 °F (36.7 °C) (Oral)   Ht 160 cm (63\")   Wt 75.3 kg (166 lb)   BMI 29.41 kg/m²     Physical Exam   Constitutional: He is oriented to person, place, and time. He appears well-developed and well-nourished.   HENT:   Head: Normocephalic and atraumatic.   Nose: Nose normal.   Eyes: Conjunctivae and EOM are normal. Right eye exhibits no discharge. Left eye exhibits no discharge.   Neck: Trachea normal, normal range of motion and phonation normal. Neck supple. No JVD present. No tracheal deviation and no edema present. No thyromegaly present.   Cardiovascular: Normal rate, regular rhythm and normal heart sounds. Exam reveals no gallop and no friction rub.   No murmur heard.  Pulmonary/Chest: Effort normal and breath sounds normal. No accessory muscle usage. No respiratory distress. He has no decreased breath sounds. He has no wheezes. He has no rales. He exhibits no tenderness.   Abdominal: Soft. He exhibits no distension, no fluid wave, no ascites, no pulsatile midline mass and no mass. There is no tenderness. There is no rebound and no guarding. No hernia.   Genitourinary:         Musculoskeletal: Normal range of motion. He exhibits no edema, tenderness or deformity.   Lymphadenopathy:     He has no cervical adenopathy.        Left: No supraclavicular adenopathy present.   Neurological: He is alert and oriented to person, place, and time. He has normal strength. No cranial nerve deficit.   Skin: Skin is warm and dry. No rash noted. He is not diaphoretic. No erythema. No pallor.   Psychiatric: He has a normal mood and affect. His speech is normal and behavior is normal. Judgment and thought content normal. Cognition and memory are normal.   Vitals reviewed.        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  This diagnosis was discussed with him today.  I recommended he " undergo examination under anesthesia and fistulotomy vs. Seton placement.  I offered to do this in the next few days.  He would like to work some and save money prior to this.  The risks and benefits of RECTAL EXAM UNDER ANESTHESIA, possible Seton placement, possible fistulotomy have been discussed and he is agreeable with proceeding.  Will plan for OR 4/8/19 per his request.            This document has been electronically signed by Porfirio Alfaro MD on April 9, 2019 4:19 PM

## 2019-04-15 ENCOUNTER — APPOINTMENT (OUTPATIENT)
Dept: PREADMISSION TESTING | Facility: HOSPITAL | Age: 49
End: 2019-04-15

## 2019-04-15 VITALS
HEART RATE: 55 BPM | RESPIRATION RATE: 12 BRPM | HEIGHT: 62 IN | DIASTOLIC BLOOD PRESSURE: 74 MMHG | WEIGHT: 165 LBS | SYSTOLIC BLOOD PRESSURE: 124 MMHG | OXYGEN SATURATION: 98 % | BODY MASS INDEX: 30.36 KG/M2

## 2019-04-18 ENCOUNTER — ANESTHESIA EVENT (OUTPATIENT)
Dept: PERIOP | Facility: HOSPITAL | Age: 49
End: 2019-04-18

## 2019-04-19 ENCOUNTER — ANESTHESIA (OUTPATIENT)
Dept: PERIOP | Facility: HOSPITAL | Age: 49
End: 2019-04-19

## 2019-04-19 ENCOUNTER — HOSPITAL ENCOUNTER (OUTPATIENT)
Facility: HOSPITAL | Age: 49
Setting detail: HOSPITAL OUTPATIENT SURGERY
Discharge: HOME | End: 2019-04-19
Attending: SURGERY | Admitting: SURGERY

## 2019-04-19 VITALS
DIASTOLIC BLOOD PRESSURE: 76 MMHG | TEMPERATURE: 96.8 F | WEIGHT: 165.79 LBS | SYSTOLIC BLOOD PRESSURE: 136 MMHG | HEIGHT: 62 IN | OXYGEN SATURATION: 98 % | RESPIRATION RATE: 18 BRPM | HEART RATE: 49 BPM | BODY MASS INDEX: 30.51 KG/M2

## 2019-04-19 DIAGNOSIS — K60.3 FISTULA-IN-ANO: ICD-10-CM

## 2019-04-19 PROCEDURE — 25010000002 MIDAZOLAM PER 1 MG: Performed by: NURSE ANESTHETIST, CERTIFIED REGISTERED

## 2019-04-19 PROCEDURE — 25010000002 ONDANSETRON PER 1 MG: Performed by: NURSE ANESTHETIST, CERTIFIED REGISTERED

## 2019-04-19 PROCEDURE — 93010 ELECTROCARDIOGRAM REPORT: CPT | Performed by: INTERNAL MEDICINE

## 2019-04-19 PROCEDURE — 25010000002 PROPOFOL 10 MG/ML EMULSION: Performed by: NURSE ANESTHETIST, CERTIFIED REGISTERED

## 2019-04-19 PROCEDURE — 25010000002 FENTANYL CITRATE (PF) 100 MCG/2ML SOLUTION: Performed by: NURSE ANESTHETIST, CERTIFIED REGISTERED

## 2019-04-19 PROCEDURE — 25010000002 CEFOXITIN: Performed by: SURGERY

## 2019-04-19 PROCEDURE — 93005 ELECTROCARDIOGRAM TRACING: CPT | Performed by: ANESTHESIOLOGY

## 2019-04-19 PROCEDURE — 25010000002 DEXAMETHASONE PER 1 MG: Performed by: NURSE ANESTHETIST, CERTIFIED REGISTERED

## 2019-04-19 PROCEDURE — 46270 REMOVE ANAL FIST SUBQ: CPT | Performed by: SURGERY

## 2019-04-19 RX ORDER — PROMETHAZINE HYDROCHLORIDE 25 MG/ML
12.5 INJECTION, SOLUTION INTRAMUSCULAR; INTRAVENOUS ONCE AS NEEDED
Status: DISCONTINUED | OUTPATIENT
Start: 2019-04-19 | End: 2019-04-19 | Stop reason: HOSPADM

## 2019-04-19 RX ORDER — LABETALOL HYDROCHLORIDE 5 MG/ML
5 INJECTION, SOLUTION INTRAVENOUS
Status: DISCONTINUED | OUTPATIENT
Start: 2019-04-19 | End: 2019-04-19 | Stop reason: HOSPADM

## 2019-04-19 RX ORDER — DIPHENHYDRAMINE HYDROCHLORIDE 50 MG/ML
12.5 INJECTION INTRAMUSCULAR; INTRAVENOUS
Status: DISCONTINUED | OUTPATIENT
Start: 2019-04-19 | End: 2019-04-19 | Stop reason: HOSPADM

## 2019-04-19 RX ORDER — PROPOFOL 10 MG/ML
VIAL (ML) INTRAVENOUS AS NEEDED
Status: DISCONTINUED | OUTPATIENT
Start: 2019-04-19 | End: 2019-04-19 | Stop reason: SURG

## 2019-04-19 RX ORDER — SODIUM CHLORIDE 9 MG/ML
100 INJECTION, SOLUTION INTRAVENOUS CONTINUOUS
Status: DISCONTINUED | OUTPATIENT
Start: 2019-04-19 | End: 2019-04-19 | Stop reason: HOSPADM

## 2019-04-19 RX ORDER — EPHEDRINE SULFATE 50 MG/ML
5 INJECTION, SOLUTION INTRAVENOUS ONCE AS NEEDED
Status: DISCONTINUED | OUTPATIENT
Start: 2019-04-19 | End: 2019-04-19 | Stop reason: HOSPADM

## 2019-04-19 RX ORDER — DEXAMETHASONE SODIUM PHOSPHATE 4 MG/ML
INJECTION, SOLUTION INTRA-ARTICULAR; INTRALESIONAL; INTRAMUSCULAR; INTRAVENOUS; SOFT TISSUE AS NEEDED
Status: DISCONTINUED | OUTPATIENT
Start: 2019-04-19 | End: 2019-04-19 | Stop reason: SURG

## 2019-04-19 RX ORDER — LIDOCAINE HYDROCHLORIDE 20 MG/ML
INJECTION, SOLUTION INFILTRATION; PERINEURAL AS NEEDED
Status: DISCONTINUED | OUTPATIENT
Start: 2019-04-19 | End: 2019-04-19 | Stop reason: SURG

## 2019-04-19 RX ORDER — HYDROCODONE BITARTRATE AND ACETAMINOPHEN 5; 325 MG/1; MG/1
1 TABLET ORAL EVERY 4 HOURS PRN
Qty: 20 TABLET | Refills: 0 | Status: SHIPPED | OUTPATIENT
Start: 2019-04-19 | End: 2019-04-30 | Stop reason: SDUPTHER

## 2019-04-19 RX ORDER — SODIUM CHLORIDE 9 MG/ML
INJECTION, SOLUTION INTRAVENOUS CONTINUOUS PRN
Status: DISCONTINUED | OUTPATIENT
Start: 2019-04-19 | End: 2019-04-19 | Stop reason: SURG

## 2019-04-19 RX ORDER — ONDANSETRON 2 MG/ML
4 INJECTION INTRAMUSCULAR; INTRAVENOUS ONCE AS NEEDED
Status: DISCONTINUED | OUTPATIENT
Start: 2019-04-19 | End: 2019-04-19 | Stop reason: HOSPADM

## 2019-04-19 RX ORDER — ACETAMINOPHEN 650 MG/1
650 SUPPOSITORY RECTAL ONCE AS NEEDED
Status: DISCONTINUED | OUTPATIENT
Start: 2019-04-19 | End: 2019-04-19 | Stop reason: HOSPADM

## 2019-04-19 RX ORDER — NALOXONE HCL 0.4 MG/ML
0.4 VIAL (ML) INJECTION AS NEEDED
Status: DISCONTINUED | OUTPATIENT
Start: 2019-04-19 | End: 2019-04-19 | Stop reason: HOSPADM

## 2019-04-19 RX ORDER — LIDOCAINE 50 MG/G
OINTMENT TOPICAL AS NEEDED
Status: DISCONTINUED | OUTPATIENT
Start: 2019-04-19 | End: 2019-04-19 | Stop reason: HOSPADM

## 2019-04-19 RX ORDER — ACETAMINOPHEN 325 MG/1
650 TABLET ORAL ONCE AS NEEDED
Status: DISCONTINUED | OUTPATIENT
Start: 2019-04-19 | End: 2019-04-19 | Stop reason: HOSPADM

## 2019-04-19 RX ORDER — BUPIVACAINE HYDROCHLORIDE AND EPINEPHRINE 5; 5 MG/ML; UG/ML
INJECTION, SOLUTION EPIDURAL; INTRACAUDAL; PERINEURAL AS NEEDED
Status: DISCONTINUED | OUTPATIENT
Start: 2019-04-19 | End: 2019-04-19 | Stop reason: HOSPADM

## 2019-04-19 RX ORDER — PROMETHAZINE HYDROCHLORIDE 25 MG/1
25 TABLET ORAL ONCE AS NEEDED
Status: DISCONTINUED | OUTPATIENT
Start: 2019-04-19 | End: 2019-04-19 | Stop reason: HOSPADM

## 2019-04-19 RX ORDER — MIDAZOLAM HYDROCHLORIDE 1 MG/ML
INJECTION INTRAMUSCULAR; INTRAVENOUS AS NEEDED
Status: DISCONTINUED | OUTPATIENT
Start: 2019-04-19 | End: 2019-04-19 | Stop reason: SURG

## 2019-04-19 RX ORDER — FENTANYL CITRATE 50 UG/ML
INJECTION, SOLUTION INTRAMUSCULAR; INTRAVENOUS AS NEEDED
Status: DISCONTINUED | OUTPATIENT
Start: 2019-04-19 | End: 2019-04-19 | Stop reason: SURG

## 2019-04-19 RX ORDER — PROMETHAZINE HYDROCHLORIDE 25 MG/1
25 SUPPOSITORY RECTAL ONCE AS NEEDED
Status: DISCONTINUED | OUTPATIENT
Start: 2019-04-19 | End: 2019-04-19 | Stop reason: HOSPADM

## 2019-04-19 RX ORDER — ONDANSETRON 2 MG/ML
INJECTION INTRAMUSCULAR; INTRAVENOUS AS NEEDED
Status: DISCONTINUED | OUTPATIENT
Start: 2019-04-19 | End: 2019-04-19 | Stop reason: SURG

## 2019-04-19 RX ORDER — DEXAMETHASONE SODIUM PHOSPHATE 4 MG/ML
8 INJECTION, SOLUTION INTRA-ARTICULAR; INTRALESIONAL; INTRAMUSCULAR; INTRAVENOUS; SOFT TISSUE ONCE AS NEEDED
Status: DISCONTINUED | OUTPATIENT
Start: 2019-04-19 | End: 2019-04-19 | Stop reason: SDUPTHER

## 2019-04-19 RX ORDER — FLUMAZENIL 0.1 MG/ML
0.2 INJECTION INTRAVENOUS AS NEEDED
Status: DISCONTINUED | OUTPATIENT
Start: 2019-04-19 | End: 2019-04-19 | Stop reason: HOSPADM

## 2019-04-19 RX ADMIN — DEXAMETHASONE SODIUM PHOSPHATE 4 MG: 4 INJECTION, SOLUTION INTRAMUSCULAR; INTRAVENOUS at 07:51

## 2019-04-19 RX ADMIN — CEFOXITIN 2 G: 2 INJECTION, POWDER, FOR SOLUTION INTRAVENOUS at 07:51

## 2019-04-19 RX ADMIN — FENTANYL CITRATE 25 MCG: 50 INJECTION, SOLUTION INTRAMUSCULAR; INTRAVENOUS at 07:47

## 2019-04-19 RX ADMIN — SODIUM CHLORIDE: 9 INJECTION, SOLUTION INTRAVENOUS at 07:16

## 2019-04-19 RX ADMIN — SODIUM CHLORIDE 100 ML/HR: 9 INJECTION, SOLUTION INTRAVENOUS at 06:16

## 2019-04-19 RX ADMIN — PROPOFOL 100 MG: 10 INJECTION, EMULSION INTRAVENOUS at 07:47

## 2019-04-19 RX ADMIN — MIDAZOLAM HYDROCHLORIDE 2 MG: 2 INJECTION, SOLUTION INTRAMUSCULAR; INTRAVENOUS at 07:38

## 2019-04-19 RX ADMIN — FENTANYL CITRATE 25 MCG: 50 INJECTION, SOLUTION INTRAMUSCULAR; INTRAVENOUS at 07:59

## 2019-04-19 RX ADMIN — FENTANYL CITRATE 50 MCG: 50 INJECTION, SOLUTION INTRAMUSCULAR; INTRAVENOUS at 08:07

## 2019-04-19 RX ADMIN — LIDOCAINE HYDROCHLORIDE 50 MG: 20 INJECTION, SOLUTION INFILTRATION; PERINEURAL at 07:47

## 2019-04-19 RX ADMIN — ONDANSETRON 4 MG: 2 INJECTION INTRAMUSCULAR; INTRAVENOUS at 07:51

## 2019-04-19 NOTE — ANESTHESIA PROCEDURE NOTES
Airway  Urgency: elective    Airway not difficult    General Information and Staff    Patient location during procedure: OR  CRNA: Brad Chase CRNA    Indications and Patient Condition  Indications for airway management: airway protection    Preoxygenated: yes  MILS not maintained throughout  Mask difficulty assessment: 1 - vent by mask    Final Airway Details  Final airway type: supraglottic airway      Successful airway: I-gel  Size 4    Number of attempts at approach: 1

## 2019-04-19 NOTE — OP NOTE
Operative Note    Rancho Mehta  4/19/2019    Pre-op Diagnosis:   Fistula-in-ano [K60.3]    Post-op Diagnosis:     Post-Op Diagnosis Codes:     * Fistula-in-ano [K60.3]    Procedure/CPT® Codes:      Procedure(s):  RECTAL EXAM UNDER ANESTHESIA, fistulotomy    Surgeon(s):  Porfirio Alfaro MD Bodily, Nathan, MD    Anesthesia: General    Staff:   Circulator: Cheyenne Workman RN  Scrub Person: Missy Coffey Pam  Assistant: Maria De Jesus Fink CSA    Estimated Blood Loss: minimal    Specimens:                None      Drains:      Findings: left anterior fistula in ano    Complications: none    Indication: Fistula In Ano    Operative Note:    The patient was seen and consented preoperatively.  Following this he was brought to the operating room and placed in supine position on the OR table.  General anesthetic was administered and the patient had a laryngeal mask airway placed and secured by anesthesia.  He was then positioned in lithotomy position using candycane stirrups.  A preoperative briefing was then performed.  The area was prepped and draped in normal sterile fashion.  A timeout was then performed.    Following timeout at the left anterior aspect of the perianal region there appeared to be a chronic opening in the skin.  A lubricated finger was used to perform a digital rectal examination.  There appeared to be some induration within the anal canal.  A fistula probe was placed and appeared to pass fairly shallow under the skin externally.  The internal opening was not immediately identifiable.  A lubricated anal retractor was then inserted into the anal canal.  An angiocatheter was inserted into the external portion of the fistula tract and hydrogen peroxide was injected.  An opening was then identified within the anal canal just beyond the dentate line.  The fistula probe was then passed once again and brought out the internal opening.  The tract appeared to be passing through a minimal  amount of the external sphincter muscles.  Local anesthetic was injected in and around this area and a fistulotomy was performed using electrocautery.  The granulation tissue within the fistula tract was then fulgurated using electrocautery.  A rolled Gelfoam with lidocaine ointment was then placed into the anal canal.  The remaining local anesthetic was injected in the perianal region.  Sterile dressings were then applied.  The patient was then awakened and returned to recovery in good condition.          This document has been electronically signed by Porfirio Alfaro MD on April 19, 2019 1:17 PM        Porfirio lAfaro MD     Date: 4/19/2019  Time: 1:15 PM

## 2019-04-19 NOTE — BRIEF OP NOTE
RECTAL EXAM UNDER ANESTHESIA  Progress Note    Rancho Mehta  4/19/2019    Pre-op Diagnosis:   Fistula-in-ano [K60.3]       Post-Op Diagnosis Codes:     * Fistula-in-ano [K60.3]    Procedure/CPT® Codes:      Procedure(s):  RECTAL EXAM UNDER ANESTHESIA, fistulotomy    Surgeon(s):  Porfirio Alfaro MD Bodily, Nathan, MD    Anesthesia: General    Staff:   Circulator: Cheyenne Workman RN  Scrub Person: Missy Coffey Pam  Assistant: Maria De Jesus Fink CSA    Estimated Blood Loss: minimal    Urine Voided: * No values recorded between 4/19/2019  7:42 AM and 4/19/2019  8:22 AM *    Specimens:                None          Drains:      Findings: left anterior fistula in ano    Complications: none          This document has been electronically signed by Porfirio Alfaro MD on April 19, 2019 8:32 AM          Porfirio Alfaro MD     Date: 4/19/2019  Time: 8:32 AM

## 2019-04-19 NOTE — ANESTHESIA POSTPROCEDURE EVALUATION
Patient: Rancho Mehta    Procedure Summary     Date:  04/19/19 Room / Location:  [facility] OR 03 / [facility] OR    Anesthesia Start:  0743 Anesthesia Stop:  0825    Procedure:  RECTAL EXAM UNDER ANESTHESIA, fistulotomy, lithotomy with candy canes (N/A Rectum) Diagnosis:       Fistula-in-ano      (Fistula-in-ano [K60.3])    Surgeon:  Porfirio Alfaro MD Provider:  Pio Gruber MD    Anesthesia Type:  general ASA Status:  2          Anesthesia Type: general  Last vitals  BP   126/67 (04/19/19 0545)   Temp   97.1 °F (36.2 °C) (04/19/19 0545)   Pulse   (!) 46 (04/19/19 0545)   Resp   16 (04/19/19 0545)     SpO2   96 % (04/19/19 0545)     Post Anesthesia Care and Evaluation    Patient location during evaluation: PACU  Patient participation: complete - patient participated  Level of consciousness: awake and awake and alert  Pain score: 2  Pain management: satisfactory to patient  Airway patency: patent  Anesthetic complications: No anesthetic complications  PONV Status: none  Cardiovascular status: acceptable and stable  Respiratory status: acceptable, room air and spontaneous ventilation  Hydration status: acceptable

## 2019-04-19 NOTE — ANESTHESIA PREPROCEDURE EVALUATION
Anesthesia Evaluation     no history of anesthetic complications:  NPO Solid Status: > 8 hours  NPO Liquid Status: > 8 hours           Airway   Mallampati: II  TM distance: >3 FB  Neck ROM: full  Possible difficult intubation  Dental    (+) poor dentition        Pulmonary - normal exam    breath sounds clear to auscultation  (-) COPD, asthma, sleep apnea, not a smoker  Cardiovascular   Exercise tolerance: good (4-7 METS)    Rhythm: regular  Rate: abnormal    (+) dysrhythmias Bradycardia,   (-) hypertension, valvular problems/murmurs, angina, cardiac stents, DVT, hyperlipidemia      Neuro/Psych  (-) seizures, TIA, CVA, headaches, psychiatric history  GI/Hepatic/Renal/Endo    (+) obesity,     (-) GERD, hepatitis, liver disease, no renal disease, diabetes, hypothyroidism    Musculoskeletal (-) negative ROS    Abdominal    Substance History   (+) alcohol use (seldom),   (-) drug use     OB/GYN          Other - negative ROS       (-) arthritis, history of cancer  ROS/Med Hx Other: Perianal abscess                  Anesthesia Plan    ASA 2     general   ( line used)  intravenous induction   Anesthetic plan, all risks, benefits, and alternatives have been provided, discussed and informed consent has been obtained with: patient and spouse/significant other.

## 2019-04-19 NOTE — INTERVAL H&P NOTE
H&P reviewed. The patient was examined and there are no changes to the H&P.           This document has been electronically signed by Porfirio Alfaro MD on April 19, 2019 7:33 AM

## 2019-04-30 ENCOUNTER — OFFICE VISIT (OUTPATIENT)
Dept: SURGERY | Facility: CLINIC | Age: 49
End: 2019-04-30

## 2019-04-30 VITALS
SYSTOLIC BLOOD PRESSURE: 110 MMHG | HEART RATE: 67 BPM | DIASTOLIC BLOOD PRESSURE: 78 MMHG | WEIGHT: 164.2 LBS | TEMPERATURE: 97.9 F | HEIGHT: 62 IN | BODY MASS INDEX: 30.22 KG/M2

## 2019-04-30 DIAGNOSIS — Z09 FOLLOW UP: Primary | ICD-10-CM

## 2019-04-30 PROCEDURE — 99024 POSTOP FOLLOW-UP VISIT: CPT | Performed by: SURGERY

## 2019-04-30 RX ORDER — HYDROCODONE BITARTRATE AND ACETAMINOPHEN 5; 325 MG/1; MG/1
1 TABLET ORAL EVERY 4 HOURS PRN
Qty: 20 TABLET | Refills: 0 | Status: SHIPPED | OUTPATIENT
Start: 2019-04-30

## 2019-05-14 ENCOUNTER — OFFICE VISIT (OUTPATIENT)
Dept: SURGERY | Facility: CLINIC | Age: 49
End: 2019-05-14

## 2019-05-14 VITALS
HEIGHT: 62 IN | WEIGHT: 163 LBS | BODY MASS INDEX: 30 KG/M2 | DIASTOLIC BLOOD PRESSURE: 82 MMHG | TEMPERATURE: 97.8 F | SYSTOLIC BLOOD PRESSURE: 116 MMHG | HEART RATE: 76 BPM

## 2019-05-14 DIAGNOSIS — Z09 FOLLOW UP: Primary | ICD-10-CM

## 2019-05-14 PROCEDURE — 99024 POSTOP FOLLOW-UP VISIT: CPT | Performed by: SURGERY

## 2019-05-26 NOTE — PROGRESS NOTES
CHIEF COMPLAINT:    Chief Complaint   Patient presents with   • Follow-up     Dylan- S/P anal fistulotomy 4-9-19.       HISTORY OF PRESENT ILLNESS:    Rancho Mehta is a 49 y.o. male who underwent anal fistulotomy on 4/19/2019.  He returns today for follow-up.  He reports no pain in the region, no incontinence.  Overall he states that he is feeling well.  He does have some difficulty with hygiene in this area.    EXAM:  Vitals:    05/14/19 1511   BP: 116/82   Pulse: 76   Temp: 97.8 °F (36.6 °C)         Healing anal fistulotomy site    ASSESSMENT:    Status post anal fistulotomy    PLAN:    Overall he appears to be healing appropriately.  Continue care to site.  Follow-up in 2 weeks.          This document has been electronically signed by Porfirio Alfaro MD on May 26, 2019 3:50 PM

## 2019-05-28 ENCOUNTER — OFFICE VISIT (OUTPATIENT)
Dept: SURGERY | Facility: CLINIC | Age: 49
End: 2019-05-28

## 2019-05-28 VITALS
DIASTOLIC BLOOD PRESSURE: 72 MMHG | SYSTOLIC BLOOD PRESSURE: 116 MMHG | TEMPERATURE: 98.1 F | HEART RATE: 76 BPM | BODY MASS INDEX: 29.77 KG/M2 | WEIGHT: 161.8 LBS | HEIGHT: 62 IN

## 2019-05-28 DIAGNOSIS — Z09 FOLLOW UP: Primary | ICD-10-CM

## 2019-05-28 PROCEDURE — 99024 POSTOP FOLLOW-UP VISIT: CPT | Performed by: SURGERY

## 2019-05-28 NOTE — PROGRESS NOTES
CHIEF COMPLAINT:    Chief Complaint   Patient presents with   • Follow-up     Dylan- S/P Anal fistulotomy 4-9-19.       HISTORY OF PRESENT ILLNESS:    Rancho Mehta is a 49 y.o. male who underwent anal fistulotomy on 4/9/2019.  He returns today for additional follow-up.  He has no complaints today. Is eating and drinking well at home, having regular bowel function. Reports no fevers or chills.      EXAM:  Vitals:    05/28/19 1354   BP: 116/72   Pulse: 76   Temp: 98.1 °F (36.7 °C)         Nearly completely healed fistulotomy site    ASSESSMENT:    Status post anal fistulotomy    PLAN:    Overall he appears to be healing appropriately.  He can see us as needed.          This document has been electronically signed by Porfirio Alfaro MD on May 28, 2019 2:15 PM

## (undated) DEVICE — GLV SURG TRIUMPH LT PF LTX 7.5 STRL

## (undated) DEVICE — PANT KNIT MATERN L/XL 2BG

## (undated) DEVICE — GLV SURG SENSICARE PI PF LF 7 GRN STRL

## (undated) DEVICE — INTENDED FOR TISSUE SEPARATION, AND OTHER PROCEDURES THAT REQUIRE A SHARP SURGICAL BLADE TO PUNCTURE OR CUT.: Brand: BARD-PARKER ® CARBON RIB-BACK BLADES

## (undated) DEVICE — GLV SURG SENSICARE GREEN W/ALOE PF LF 8 STRL

## (undated) DEVICE — UNDRPD BREATH 23X36 BG/10

## (undated) DEVICE — GLV SURG SENSICARE PI LF PF 7.5 GRN STRL

## (undated) DEVICE — WRAP LEG 31X48X6IN STRL

## (undated) DEVICE — GLV SURG SENSICARE POLYISPRN W/ALOE PF LF 6 GRN STRL

## (undated) DEVICE — SPNG GZ WOVN 4X4IN 12PLY 10/BX STRL

## (undated) DEVICE — PAD,ABDOMINAL,8"X10",ST,LF: Brand: MEDLINE

## (undated) DEVICE — GOWN,AURORA,NOREINF,RAGLAN,XL,STERILE: Brand: MEDLINE

## (undated) DEVICE — STERILE POLYISOPRENE POWDER-FREE SURGICAL GLOVES WITH EMOLLIENT COATING: Brand: PROTEXIS

## (undated) DEVICE — PREP PVP-I 7.5P BT 4OZ

## (undated) DEVICE — GLV SURG SENSICARE POLYISPRN W/ALOE PF LF 6.5 GRN STRL

## (undated) DEVICE — PK MAJ PROC LF 60

## (undated) DEVICE — GLV SURG TRIUMPH LT PF LTX 7 STRL

## (undated) DEVICE — BANDAGE,GAUZE,BULKEE II,4.5"X4.1YD,STRL: Brand: MEDLINE

## (undated) DEVICE — GLV SURG TRIUMPH LT PF LTX 6.5 STRL

## (undated) DEVICE — SOL IRR NACL 0.9PCT BT 1000ML

## (undated) DEVICE — PREP SOL POVIDONE/IODINE BT 4OZ

## (undated) DEVICE — GLV SURG SENSICARE PI LF PF 8 GRN STRL

## (undated) DEVICE — DBD-PACK, LITHOTOMY VI: Brand: MEDLINE

## (undated) DEVICE — SOL HYDROGEN PEROX 3PCT 4OZ

## (undated) DEVICE — CATH IV INTROCAN SFTY FEP 18G 1.25IN

## (undated) DEVICE — GLV SURG SENSICARE GREEN W/ALOE PF LF 7 STRL

## (undated) DEVICE — STRIP PACKING W IODOFORM 1/4